# Patient Record
Sex: MALE | Race: WHITE | Employment: FULL TIME | URBAN - METROPOLITAN AREA
[De-identification: names, ages, dates, MRNs, and addresses within clinical notes are randomized per-mention and may not be internally consistent; named-entity substitution may affect disease eponyms.]

---

## 2017-07-15 DIAGNOSIS — G47.00 INSOMNIA, UNSPECIFIED TYPE: ICD-10-CM

## 2017-07-17 RX ORDER — CITALOPRAM HYDROBROMIDE 20 MG/1
TABLET ORAL
Qty: 90 TABLET | Refills: 3 | OUTPATIENT
Start: 2017-07-17

## 2017-07-17 NOTE — TELEPHONE ENCOUNTER
Due for annual CPE.  Spoke with patient and schedule for 7/21/17  Patient has enough supply for coverage until appt time.   Will defer for PCP to refill at [facility]    Next 5 appointments (look out 90 days)     Jul 21, 2017  9:40 AM CDT   PHYSICAL with Esteban Herron MD   [facility] ([facility])    [address] [place] [id number] [phone]                  Jena Walker RN

## 2017-07-17 NOTE — TELEPHONE ENCOUNTER
Citalopram (CELEXA) 20 MG tablet     Last Written Prescription Date: 07/12/2016  Last Fill Quantity: 90 tablet, # refills: 3  Last Office Visit with [facility] primary care provider:  11/25/2016        Last PHQ-9 score on record=   PHQ-9 SCORE 9/28/2010   Total Score 7               Karen Rendon MA

## 2017-07-21 ENCOUNTER — OFFICE VISIT (OUTPATIENT)
Dept: FAMILY MEDICINE | Facility: CLINIC | Age: 62
End: 2017-07-21
Payer: COMMERCIAL

## 2017-07-21 VITALS
OXYGEN SATURATION: 100 % | SYSTOLIC BLOOD PRESSURE: 105 MMHG | HEIGHT: 71 IN | HEART RATE: 54 BPM | DIASTOLIC BLOOD PRESSURE: 68 MMHG | TEMPERATURE: 97.6 F | RESPIRATION RATE: 16 BRPM | WEIGHT: 178 LBS | BODY MASS INDEX: 24.92 KG/M2

## 2017-07-21 DIAGNOSIS — Z00.00 HEALTH CARE MAINTENANCE: Primary | ICD-10-CM

## 2017-07-21 DIAGNOSIS — Z11.59 NEED FOR HEPATITIS C SCREENING TEST: ICD-10-CM

## 2017-07-21 DIAGNOSIS — Z23 NEED FOR PROPHYLACTIC VACCINATION WITH TETANUS-DIPHTHERIA (TD): ICD-10-CM

## 2017-07-21 DIAGNOSIS — Z12.5 SCREENING FOR PROSTATE CANCER: ICD-10-CM

## 2017-07-21 DIAGNOSIS — Z23 NEED FOR VACCINATION: ICD-10-CM

## 2017-07-21 LAB
ALBUMIN SERPL-MCNC: 3.6 G/DL (ref 3.4–5)
ALP SERPL-CCNC: 44 U/L (ref 40–150)
ALT SERPL W P-5'-P-CCNC: 17 U/L (ref 0–70)
ANION GAP SERPL CALCULATED.3IONS-SCNC: 4 MMOL/L (ref 3–14)
AST SERPL W P-5'-P-CCNC: 15 U/L (ref 0–45)
BILIRUB SERPL-MCNC: 0.6 MG/DL (ref 0.2–1.3)
BUN SERPL-MCNC: 14 MG/DL (ref 7–30)
CALCIUM SERPL-MCNC: 8.9 MG/DL (ref 8.5–10.1)
CHLORIDE SERPL-SCNC: 106 MMOL/L (ref 94–109)
CHOLEST SERPL-MCNC: 181 MG/DL
CO2 SERPL-SCNC: 29 MMOL/L (ref 20–32)
CREAT SERPL-MCNC: 0.85 MG/DL (ref 0.66–1.25)
GFR SERPL CREATININE-BSD FRML MDRD: ABNORMAL ML/MIN/1.7M2
GLUCOSE SERPL-MCNC: 91 MG/DL (ref 70–99)
HCV AB SERPL QL IA: NORMAL
HDLC SERPL-MCNC: 101 MG/DL
HGB BLD-MCNC: 13.8 G/DL (ref 13.3–17.7)
LDLC SERPL CALC-MCNC: 70 MG/DL
NONHDLC SERPL-MCNC: 80 MG/DL
POTASSIUM SERPL-SCNC: 4.1 MMOL/L (ref 3.4–5.3)
PROT SERPL-MCNC: 6.6 G/DL (ref 6.8–8.8)
PSA SERPL-ACNC: 0.61 UG/L (ref 0–4)
SODIUM SERPL-SCNC: 139 MMOL/L (ref 133–144)
TRIGL SERPL-MCNC: 49 MG/DL

## 2017-07-21 PROCEDURE — 80061 LIPID PANEL: CPT | Performed by: FAMILY MEDICINE

## 2017-07-21 PROCEDURE — 85018 HEMOGLOBIN: CPT | Performed by: FAMILY MEDICINE

## 2017-07-21 PROCEDURE — 36415 COLL VENOUS BLD VENIPUNCTURE: CPT | Performed by: FAMILY MEDICINE

## 2017-07-21 PROCEDURE — 99396 PREV VISIT EST AGE 40-64: CPT | Mod: 25 | Performed by: FAMILY MEDICINE

## 2017-07-21 PROCEDURE — 90714 TD VACC NO PRESV 7 YRS+ IM: CPT | Performed by: FAMILY MEDICINE

## 2017-07-21 PROCEDURE — 80053 COMPREHEN METABOLIC PANEL: CPT | Performed by: FAMILY MEDICINE

## 2017-07-21 PROCEDURE — G0103 PSA SCREENING: HCPCS | Performed by: FAMILY MEDICINE

## 2017-07-21 PROCEDURE — 90471 IMMUNIZATION ADMIN: CPT | Performed by: FAMILY MEDICINE

## 2017-07-21 PROCEDURE — 86803 HEPATITIS C AB TEST: CPT | Performed by: FAMILY MEDICINE

## 2017-07-21 NOTE — PROGRESS NOTES
SUBJECTIVE:   CC: Cheko Rodarte is an 61 year old male who presents for preventative health visit.     Physical   Annual:     Getting at least 3 servings of Calcium per day::  NO    Bi-annual eye exam::  NO    Dental care twice a year::  Yes    Sleep apnea or symptoms of sleep apnea::  Excessive snoring    Diet::  Regular (no restrictions)    Frequency of exercise::  4-5 days/week    Duration of exercise::  30-45 minutes    Taking medications regularly::  Yes    Medication side effects::  None    Additional concerns today::  No        Today's PHQ-2 Score: PHQ-2 ( 1999 Pfizer) 7/18/2017   Q1: Little interest or pleasure in doing things 0   Q2: Feeling down, depressed or hopeless 0   PHQ-2 Score 0   Q1: Little interest or pleasure in doing things Not at all   Q2: Feeling down, depressed or hopeless Not at all   PHQ-2 Score 0       Abuse: Current or Past(Physical, Sexual or Emotional)- No  Do you feel safe in your environment - Yes    Social History   Substance Use Topics     Smoking status: Never Smoker     Smokeless tobacco: Never Used     Alcohol use 7.0 oz/week     The patient does not drink >3 drinks per day nor >7 drinks per week.    Last PSA:   PSA   Date Value Ref Range Status   07/12/2016 0.50 0 - 4 ug/L Final       Reviewed orders with patient. Reviewed health maintenance and updated orders accordingly - Yes  BP Readings from Last 3 Encounters:   07/21/17 105/68   11/25/16 123/77   11/16/16 103/73    Wt Readings from Last 3 Encounters:   07/21/17 178 lb (80.7 kg)   11/25/16 180 lb (81.6 kg)   11/16/16 180 lb 6.4 oz (81.8 kg)                  Patient Active Problem List   Diagnosis     Anxiety state     Aneurysm of thoracic aorta (H)     Insomnia     Snoring     History of nephrectomy, unilateral     HYPERLIPIDEMIA LDL GOAL <160     Health Care Home     Advanced directives, counseling/discussion     History of prostatitis     Medial epicondylitis of right elbow     Past Surgical History:   Procedure Laterality  Date     CARPAL TUNNEL RELEASE RT/LT       HC COLONOSCOPY THRU STOMA, DIAGNOSTIC  2004    polyp     LASIK  6/6/2012     NEPHRECTOMY RT/LT      left     SURGICAL HISTORY OF -       basal cell removal forehead       Social History   Substance Use Topics     Smoking status: Never Smoker     Smokeless tobacco: Never Used     Alcohol use 7.0 oz/week     Family History   Problem Relation Age of Onset     C.A.D. Father      thoracic aneurysm     Cancer - colorectal Father      CANCER Father      bladder     Cancer - colorectal Maternal Uncle      Cancer - colorectal Paternal Uncle      Circulatory Brother      thoracic Aneurysm         Current Outpatient Prescriptions   Medication Sig Dispense Refill     KRILL OIL PO Take by mouth daily       fexofenadine (ALLEGRA) 180 MG tablet Take 1 tablet (180 mg) by mouth daily 30 tablet 1     Misc Natural Products (PROSTATE HEALTH PO)        Coenzyme Q10 (CO Q 10) 100 MG CAPS Take 300 mg by mouth       citalopram (CELEXA) 20 MG tablet Take 1 tablet (20 mg) by mouth daily 90 tablet 3     simvastatin (ZOCOR) 10 MG tablet TAKE 1 TABLET BY MOUTH DAILY AT BEDTIME 90 tablet 3     cholecalciferol 2000 UNITS tablet Take 2 tablets by mouth daily 90 tablet 3     Allergies   Allergen Reactions     No Known Allergies      Recent Labs   Lab Test  07/12/16   1132  04/24/15   0801  04/15/14   1327   04/27/10   0907   LDL  88  83  77   < >  112   HDL  91  86  94   < >  82   TRIG  57  59  69   < >  64   ALT  21  19  21   < >  22   CR  0.85  0.87  0.88   < >  0.91   GFRESTIMATED  >90  Non  GFR Calc    90  90   < >  88   GFRESTBLACK  >90   GFR Calc    >90   GFR Calc    >90   < >  >90   POTASSIUM  4.0  4.3  4.2   < >  4.7   TSH   --    --   0.90   --   1.41    < > = values in this interval not displayed.              Reviewed and updated as needed this visit by clinical staff         Reviewed and updated as needed this visit by Provider        Past Medical  "History:   Diagnosis Date     Attention deficit disorder without mention of hyperactivity      Insomnia, unspecified      Other and unspecified malignant neoplasm of skin of other and unspecified parts of face     BCC      Past Surgical History:   Procedure Laterality Date     CARPAL TUNNEL RELEASE RT/LT       HC COLONOSCOPY THRU STOMA, DIAGNOSTIC  2004    polyp     LASIK  6/6/2012     NEPHRECTOMY RT/LT      left     SURGICAL HISTORY OF -       basal cell removal forehead       ROS:  C: NEGATIVE for fever, chills, change in weight  I: NEGATIVE for worrisome rashes, moles or lesions  E: NEGATIVE for vision changes or irritation  ENT: NEGATIVE for ear, mouth and throat problems  R: NEGATIVE for significant cough or SOB  CV: NEGATIVE for chest pain, palpitations or peripheral edema  GI: NEGATIVE for nausea, abdominal pain, heartburn, or change in bowel habits   male: negative for dysuria, hematuria, decreased urinary stream, erectile dysfunction, urethral discharge  M: NEGATIVE for significant arthralgias or myalgia  N: NEGATIVE for weakness, dizziness or paresthesias  P: NEGATIVE for changes in mood or affect    OBJECTIVE:   /68 (Cuff Size: Adult Regular)  Pulse 54  Temp 97.6  F (36.4  C) (Tympanic)  Resp 16  Ht 5' 10.5\" (1.791 m)  Wt 178 lb (80.7 kg)  SpO2 100%  BMI 25.18 kg/m2    EXAM:  GENERAL: healthy, alert and no distress  EYES: Eyes grossly normal to inspection, PERRL and conjunctivae and sclerae normal  HENT: ear canals and TM's normal, nose and mouth without ulcers or lesions  NECK: no adenopathy, no asymmetry, masses, or scars and thyroid normal to palpation  RESP: lungs clear to auscultation - no rales, rhonchi or wheezes  CV: regular rate and rhythm, normal S1 S2, no S3 or S4, no murmur, click or rub, no peripheral edema and peripheral pulses strong  ABDOMEN: soft, nontender, no hepatosplenomegaly, no masses and bowel sounds normal   (male): normal male genitalia without lesions or " "urethral discharge, no hernia  MS: no gross musculoskeletal defects noted, no edema  SKIN: no suspicious lesions or rashes  NEURO: Normal strength and tone, mentation intact and speech normal  BACK: no CVA tenderness, no paralumbar tenderness  PSYCH: mentation appears normal, affect normal/bright  LYMPH: no cervical, supraclavicular, axillary, or inguinal adenopathy    ASSESSMENT/PLAN:       ICD-10-CM    1. Health care maintenance Z00.00 Hepatitis C Screen Reflex to HCV RNA Quant and Genotype     Hemoglobin     Lipid panel reflex to direct LDL     PSA, screen     Comprehensive metabolic panel (BMP + Alb, Alk Phos, ALT, AST, Total. Bili, TP)   2. Need for hepatitis C screening test Z11.59 Hepatitis C Screen Reflex to HCV RNA Quant and Genotype   3. Need for prophylactic vaccination with tetanus-diphtheria (TD) Z23    4. Screening for prostate cancer Z12.5 PSA, screen       COUNSELING:   Reviewed preventive health counseling, as reflected in patient instructions    BP Screening:   Last 3 BP Readings:    BP Readings from Last 3 Encounters:   07/21/17 105/68   11/25/16 123/77   11/16/16 103/73       The following was recommended to the patient:  Re-screen BP within a year and recommended lifestyle modifications       reports that he has never smoked. He has never used smokeless tobacco.      Estimated body mass index is 25.46 kg/(m^2) as calculated from the following:    Height as of 11/25/16: 5' 10.5\" (1.791 m).    Weight as of 11/25/16: 180 lb (81.6 kg).         Counseling Resources:  ATP IV Guidelines  Pooled Cohorts Equation Calculator  FRAX Risk Assessment  ICSI Preventive Guidelines  Dietary Guidelines for Americans, 2010  Daixe's MyPlate  ASA Prophylaxis  Lung CA Screening    Esteban Herron MD  [facility] for HPI/ROS submitted by the patient on 7/18/2017   PHQ-2 Score: 0    "

## 2017-07-21 NOTE — NURSING NOTE
"Chief Complaint   Patient presents with     Physical       Initial /68 (Cuff Size: Adult Regular)  Pulse 54  Temp 97.6  F (36.4  C) (Tympanic)  Resp 16  Ht 5' 10.5\" (1.791 m)  Wt 178 lb (80.7 kg)  SpO2 100%  BMI 25.18 kg/m2 Estimated body mass index is 25.18 kg/(m^2) as calculated from the following:    Height as of this encounter: 5' 10.5\" (1.791 m).    Weight as of this encounter: 178 lb (80.7 kg).  Medication Reconciliation: complete   Lillian England, CMA    "

## 2017-07-21 NOTE — MR AVS SNAPSHOT
After Visit Summary   7/21/2017    Cheko Rodarte    MRN: [id number]           Patient Information     Date Of Birth          1955        Visit Information        Provider Department      7/21/2017 9:40 AM Esteban Herron MD [facility]        Today's Diagnoses     Health care maintenance    -  1    Need for hepatitis C screening test        Need for prophylactic vaccination with tetanus-diphtheria (TD)        Screening for prostate cancer        Need for vaccination          Care Instructions      Preventive Health Recommendations  Male Ages 50 - 64    Yearly exam:             See your health care provider every year in order to  o   Review health changes.   o   Discuss preventive care.    o   Review your medicines if your doctor has prescribed any.     Have a cholesterol test every 5 years, or more frequently if you are at risk for high cholesterol/heart disease.     Have a diabetes test (fasting glucose) every three years. If you are at risk for diabetes, you should have this test more often.     Have a colonoscopy at age 50, or have a yearly FIT test (stool test). These exams will check for colon cancer.      Talk with your health care provider about whether or not a prostate cancer screening test (PSA) is right for you.    You should be tested each year for STDs (sexually transmitted diseases), if you re at risk.     Shots: Get a flu shot each year. Get a tetanus shot every 10 years.     Nutrition:    Eat at least 5 servings of fruits and vegetables daily.     Eat whole-grain bread, whole-wheat pasta and brown rice instead of white grains and rice.     Talk to your provider about Calcium and Vitamin D.     Lifestyle    Exercise for at least 150 minutes a week (30 minutes a day, 5 days a week). This will help you control your weight and prevent disease.     Limit alcohol to one drink per day.     No smoking.     Wear sunscreen to prevent skin cancer.     See your dentist every  "six months for an exam and cleaning.     See your eye doctor every 1 to 2 years.            Follow-ups after your visit        Who to contact     If you have questions or need follow up information about today's clinic visit or your schedule please contact [facility] [place] directly at [phone].  Normal or non-critical lab and imaging results will be communicated to you by MyChart, letter or phone within 4 business days after the clinic has received the results. If you do not hear from us within 7 days, please contact the clinic through [employer] or phone. If you have a critical or abnormal lab result, we will notify you by phone as soon as possible.  Submit refill requests through [employer] or call your pharmacy and they will forward the refill request to us. Please allow 3 business days for your refill to be completed.          Additional Information About Your Visit        MyChart Information     [employer] gives you secure access to your electronic health record. If you see a primary care provider, you can also send messages to your care team and make appointments. If you have questions, please call your primary care clinic.  If you do not have a primary care provider, please call [phone] and they will assist you.        Care EveryWhere ID     This is your Care EveryWhere ID. This could be used by other organizations to access your [place] medical records  [id number]        Your Vitals Were     Pulse Temperature Respirations Height Pulse Oximetry BMI (Body Mass Index)    54 97.6  F (36.4  C) (Tympanic) 16 5' 10.5\" (1.791 m) 100% 25.18 kg/m2       Blood Pressure from Last 3 Encounters:   07/21/17 105/68   11/25/16 123/77   11/16/16 103/73    Weight from Last 3 Encounters:   07/21/17 178 lb (80.7 kg)   11/25/16 180 lb (81.6 kg)   11/16/16 180 lb 6.4 oz (81.8 kg)              We Performed the Following     1st  Administration  [[id number]]     Comprehensive metabolic panel (BMP + Alb, Alk Phos, ALT, " AST, Total. Bili, TP)     Hemoglobin     Hepatitis C Screen Reflex to HCV RNA Quant and Genotype     Lipid panel reflex to direct LDL     PSA, screen     TD PRSERV FREE >=7 YRS ADS IM [[id number]]        Primary Care Provider Office Phone # Fax #    Esteban Herron [phone]       [place] [address] [facility] [address]        Equal Access to Services     MARTHA CLARKE : Hadii aad ku hadasho Soomaali, waaxda luqadaha, qaybta kaalmada adeegyada, waxay idiin hayaan adeeg kharash la'aan . So [facility] [phone].    ATENCIÓN: Si habla español, tiene a orlando disposición servicios gratuitos de asistencia lingüística. Llame al [phone].    We comply with applicable federal civil rights laws and [place] laws. We do not discriminate on the basis of race, color, national origin, age, disability sex, sexual orientation or gender identity.            Thank you!     Thank you for choosing [facility]  for your care. Our goal is always to provide you with excellent care. Hearing back from our patients is one way we can continue to improve our services. Please take a few minutes to complete the written survey that you may receive in the mail after your visit with us. Thank you!             Your Updated Medication List - Protect others around you: Learn how to safely use, store and throw away your medicines at www.disposemymeds.org.          This list is accurate as of: 7/21/17 10:23 AM.  Always use your most recent med list.                   Brand Name Dispense Instructions for use Diagnosis    cholecalciferol 2000 UNITS tablet     90 tablet    Take 2 tablets by mouth daily    Hyperlipidemia LDL goal <160       citalopram 20 MG tablet    celeXA    90 tablet    Take 1 tablet (20 mg) by mouth daily    Insomnia, unspecified type       Co Q 10 100 MG Caps      Take 300 mg by mouth        fexofenadine 180 MG tablet    ALLEGRA    30 tablet    Take 1 tablet (180 mg) by mouth  daily    Cough, Seasonal allergic rhinitis due to other allergic trigger       KRILL OIL PO      Take by mouth daily        PROSTATE HEALTH PO           simvastatin 10 MG tablet    ZOCOR    90 tablet    TAKE 1 TABLET BY MOUTH DAILY AT BEDTIME    Hyperlipidemia LDL goal <160

## 2017-07-26 DIAGNOSIS — E78.5 HYPERLIPIDEMIA LDL GOAL <160: ICD-10-CM

## 2017-07-26 RX ORDER — SIMVASTATIN 10 MG
TABLET ORAL
Qty: 90 TABLET | Refills: 3 | Status: SHIPPED | OUTPATIENT
Start: 2017-07-26 | End: 2018-06-04

## 2017-07-26 NOTE — TELEPHONE ENCOUNTER
Simvastatin     Last Written Prescription Date: 7/12/16  Last Fill Quantity: 90, # refills: 3  Last Office Visit with [facility], [facility] or [facility] prescribing provider: 7/21/17       Lab Results   Component Value Date    CHOL 181 07/21/2017     Lab Results   Component Value Date     07/21/2017     Lab Results   Component Value Date    LDL 70 07/21/2017     Lab Results   Component Value Date    TRIG 49 07/21/2017     Lab Results   Component Value Date    CHOLHDLRATIO 2.1 04/24/2015     Jeannette Magdaleno CMA

## 2017-07-26 NOTE — TELEPHONE ENCOUNTER
Prescription approved per [facility], [facility] or [facility] refill protocol.  Destinee Elena RN - Triage  [facility]

## 2017-07-29 DIAGNOSIS — E78.5 HYPERLIPIDEMIA LDL GOAL <160: ICD-10-CM

## 2017-07-31 RX ORDER — SIMVASTATIN 10 MG
TABLET ORAL
Qty: 90 TABLET | Refills: 3 | OUTPATIENT
Start: 2017-07-31

## 2017-07-31 NOTE — TELEPHONE ENCOUNTER
Simvastatin     Last Written Prescription Date: 7/26/17  Last Fill Quantity: 90, # refills: 3  Last Office Visit with [facility], [facility] or [facility] prescribing provider: 7/21/17       Lab Results   Component Value Date    CHOL 181 07/21/2017     Lab Results   Component Value Date     07/21/2017     Lab Results   Component Value Date    LDL 70 07/21/2017     Lab Results   Component Value Date    TRIG 49 07/21/2017     Lab Results   Component Value Date    CHOLHDLRATIO 2.1 04/24/2015     Jeannette Magdaleno CMA

## 2017-09-12 ENCOUNTER — OFFICE VISIT (OUTPATIENT)
Dept: FAMILY MEDICINE | Facility: CLINIC | Age: 62
End: 2017-09-12
Payer: COMMERCIAL

## 2017-09-12 VITALS
RESPIRATION RATE: 16 BRPM | HEIGHT: 71 IN | DIASTOLIC BLOOD PRESSURE: 62 MMHG | TEMPERATURE: 98.5 F | SYSTOLIC BLOOD PRESSURE: 96 MMHG | OXYGEN SATURATION: 98 % | BODY MASS INDEX: 24.5 KG/M2 | WEIGHT: 175 LBS | HEART RATE: 81 BPM

## 2017-09-12 DIAGNOSIS — G47.33 OSA (OBSTRUCTIVE SLEEP APNEA): ICD-10-CM

## 2017-09-12 DIAGNOSIS — H90.2 CONDUCTIVE HEARING LOSS, UNSPECIFIED LATERALITY: ICD-10-CM

## 2017-09-12 DIAGNOSIS — M25.511 ARTHRALGIA OF RIGHT ACROMIOCLAVICULAR JOINT: ICD-10-CM

## 2017-09-12 DIAGNOSIS — J20.9 ACUTE BRONCHITIS WITH SYMPTOMS > 10 DAYS: Primary | ICD-10-CM

## 2017-09-12 PROCEDURE — 99214 OFFICE O/P EST MOD 30 MIN: CPT | Performed by: FAMILY MEDICINE

## 2017-09-12 RX ORDER — AZITHROMYCIN 250 MG/1
TABLET, FILM COATED ORAL
Qty: 6 TABLET | Refills: 0 | Status: SHIPPED | OUTPATIENT
Start: 2017-09-12 | End: 2018-01-26

## 2017-09-12 NOTE — MR AVS SNAPSHOT
After Visit Summary   9/12/2017    Cheko Rodarte    MRN: [id number]           Patient Information     Date Of Birth          1955        Visit Information        Provider Department      9/12/2017 1:40 PM Esteban Herron [place] [facility]        Today's Diagnoses     Acute bronchitis with symptoms > 10 days    -  1    Conductive hearing loss, unspecified laterality        TALI (obstructive sleep apnea)        Arthralgia of right acromioclavicular joint           Follow-ups after your visit        Additional Services     OTOLARYNGOLOGY REFERRAL       Your provider has referred you to: [facility] [place] [phone]   [email or web address]    Please be aware that coverage of these services is subject to the terms and limitations of your health insurance plan.  Call member services at your health plan with any benefit or coverage questions.      Please bring the following with you to your appointment:    (1) Any X-Rays, CTs or MRIs which have been performed.  Contact the facility where they were done to arrange for  prior to your scheduled appointment.   (2) List of current medications  (3) This referral request   (4) Any documents/labs given to you for this referral            SLEEP EVALUATION & MANAGEMENT REFERRAL - ADULT       Please be aware that coverage of these services is subject to the terms and limitations of your health insurance plan.  Call member services at your health plan with any benefit or coverage questions.      Please bring the following to your appointment:    >>   List of current medications   >>   This referral request   >>   Any documents/labs given to you for this referral    [facility] ([place])   [phone] (Age 18 and up)                  Future tests that were ordered for you today     Open Future Orders        Priority Expected Expires Ordered    SLEEP EVALUATION & MANAGEMENT REFERRAL - ADULT Routine   "9/12/2018 9/12/2017            Who to contact     If you have questions or need follow up information about today's clinic visit or your schedule please contact [facility] [place] directly at [phone].  Normal or non-critical lab and imaging results will be communicated to you by [employer], letter or phone within 4 business days after the clinic has received the results. If you do not hear from us within 7 days, please contact the clinic through [employer] or phone. If you have a critical or abnormal lab result, we will notify you by phone as soon as possible.  Submit refill requests through [employer] or call your pharmacy and they will forward the refill request to us. Please allow 3 business days for your refill to be completed.          Additional Information About Your Visit        [employer] Information     [employer] gives you secure access to your electronic health record. If you see a primary care provider, you can also send messages to your care team and make appointments. If you have questions, please call your primary care clinic.  If you do not have a primary care provider, please call [phone] and they will assist you.        Care EveryWhere ID     This is your Care EveryWhere ID. This could be used by other organizations to access your [place] medical records  [id number]        Your Vitals Were     Pulse Temperature Respirations Height Pulse Oximetry BMI (Body Mass Index)    81 98.5  F (36.9  C) (Tympanic) 16 5' 10.5\" (1.791 m) 98% 24.75 kg/m2       Blood Pressure from Last 3 Encounters:   09/12/17 96/62   07/21/17 105/68   11/25/16 123/77    Weight from Last 3 Encounters:   09/12/17 175 lb (79.4 kg)   07/21/17 178 lb (80.7 kg)   11/25/16 180 lb (81.6 kg)              We Performed the Following     OTOLARYNGOLOGY REFERRAL          Today's Medication Changes          These changes are accurate as of: 9/12/17  2:09 PM.  If you have any questions, ask your nurse or doctor.               Start " taking these medicines.        Dose/Directions    azithromycin 250 MG tablet   Commonly known as:  ZITHROMAX   Used for:  Acute bronchitis with symptoms > 10 days   Started by:  Esteban Herron MD        Two tablets first day, then one tablet daily for four days.   Quantity:  6 tablet   Refills:  0            Where to get your medicines      These medications were sent to [facility] [id number] [facility] [place] [address]  [address] [place] [address], [facility] [address]     Phone:  [phone]     azithromycin 250 MG tablet                Primary Care Provider Office Phone # Fax #    Esteban Herron [phone]       [address] [facility] [address]        Equal Access to Services     [facility] : Hadii yahaira richardson hadasho Somack, waaxda luqadaha, qaybta kaalmada adeegyada, naseem gold . So [facility] [phone].    ATENCIÓN: Si habla español, tiene a orlando disposición servicios gratuitos de asistencia lingüística. Llame al [phone].    We comply with applicable federal civil rights laws and [place] laws. We do not discriminate on the basis of race, color, national origin, age, disability sex, sexual orientation or gender identity.            Thank you!     Thank you for choosing [facility]  for your care. Our goal is always to provide you with excellent care. Hearing back from our patients is one way we can continue to improve our services. Please take a few minutes to complete the written survey that you may receive in the mail after your visit with us. Thank you!             Your Updated Medication List - Protect others around you: Learn how to safely use, store and throw away your medicines at www.disposemymeds.org.          This list is accurate as of: 9/12/17  2:09 PM.  Always use your most recent med list.                   Brand Name Dispense Instructions for use Diagnosis    azithromycin 250 MG tablet    ZITHROMAX    6 tablet    Two  tablets first day, then one tablet daily for four days.    Acute bronchitis with symptoms > 10 days       cholecalciferol 2000 UNITS tablet     90 tablet    Take 2 tablets by mouth daily    Hyperlipidemia LDL goal <160       citalopram 20 MG tablet    celeXA    90 tablet    Take 1 tablet (20 mg) by mouth daily    Insomnia, unspecified type       Co Q 10 100 MG Caps      Take 300 mg by mouth        KRILL OIL PO      Take by mouth daily        PROSTATE HEALTH PO           simvastatin 10 MG tablet    ZOCOR    90 tablet    TAKE 1 TABLET BY MOUTH DAILY AT BEDTIME    Hyperlipidemia LDL goal <160

## 2017-09-12 NOTE — PROGRESS NOTES
SUBJECTIVE:   Cheko Rodarte is a 61 year old male who presents to clinic today for the following health issues:      RESPIRATORY SYMPTOMS      Duration: 6 days     Description  nasal congestion, rhinorrhea and cough    Severity: moderate    Accompanying signs and symptoms: None    History (predisposing factors):  none    Precipitating or alleviating factors: None    Therapies tried and outcome:  NyQuil     Problem list and histories reviewed & adjusted, as indicated.  Additional history: as documented    Patient Active Problem List   Diagnosis     Anxiety state     Aneurysm of thoracic aorta (H)     Insomnia     Snoring     History of nephrectomy, unilateral     HYPERLIPIDEMIA LDL GOAL <160     Health Care Home     Advanced directives, counseling/discussion     History of prostatitis     Medial epicondylitis of right elbow     Past Surgical History:   Procedure Laterality Date     CARPAL TUNNEL RELEASE RT/LT       HC COLONOSCOPY THRU STOMA, DIAGNOSTIC  2004    polyp     LASIK  6/6/2012     NEPHRECTOMY RT/LT      left     SURGICAL HISTORY OF -       basal cell removal forehead       Social History   Substance Use Topics     Smoking status: Never Smoker     Smokeless tobacco: Never Used     Alcohol use 7.0 oz/week     Family History   Problem Relation Age of Onset     C.A.D. Father      thoracic aneurysm     Cancer - colorectal Father      CANCER Father      bladder     Cancer - colorectal Maternal Uncle      Cancer - colorectal Paternal Uncle      Circulatory Brother      thoracic Aneurysm         Current Outpatient Prescriptions   Medication Sig Dispense Refill     azithromycin (ZITHROMAX) 250 MG tablet Two tablets first day, then one tablet daily for four days. 6 tablet 0     simvastatin (ZOCOR) 10 MG tablet TAKE 1 TABLET BY MOUTH DAILY AT BEDTIME 90 tablet 3     KRILL OIL PO Take by mouth daily       Misc Natural Products (PROSTATE HEALTH PO)        Coenzyme Q10 (CO Q 10) 100 MG CAPS Take 300 mg by mouth        "citalopram (CELEXA) 20 MG tablet Take 1 tablet (20 mg) by mouth daily 90 tablet 3     cholecalciferol 2000 UNITS tablet Take 2 tablets by mouth daily 90 tablet 3     Allergies   Allergen Reactions     No Known Allergies      Recent Labs   Lab Test  07/21/17   1016  07/12/16   1132  04/24/15   0801  04/15/14   1327   04/27/10   0907   LDL  70  88  83  77   < >  112   HDL  101  91  86  94   < >  82   TRIG  49  57  59  69   < >  64   ALT  17  21  19  21   < >  22   CR  0.85  0.85  0.87  0.88   < >  0.91   GFRESTIMATED  >90  Non  GFR Calc    >90  Non  GFR Calc    90  90   < >  88   GFRESTBLACK  >90   GFR Calc    >90   GFR Calc    >90   GFR Calc    >90   < >  >90   POTASSIUM  4.1  4.0  4.3  4.2   < >  4.7   TSH   --    --    --   0.90   --   1.41    < > = values in this interval not displayed.      BP Readings from Last 3 Encounters:   09/12/17 96/62   07/21/17 105/68   11/25/16 123/77    Wt Readings from Last 3 Encounters:   09/12/17 175 lb (79.4 kg)   07/21/17 178 lb (80.7 kg)   11/25/16 180 lb (81.6 kg)          Reviewed and updated as needed this visit by clinical staff     Reviewed and updated as needed this visit by Provider         ROS:  C: NEGATIVE for fever, chills, change in weight  ENT/MOUTH: cough and sinus drainage, difficulty hearing   R: NEGATIVE for significant cough or SOB  CV: NEGATIVE for chest pain, palpitations or peripheral edema  MUSCULOSKELETAL: tip of shoulder pain    OBJECTIVE:     BP 96/62 (Cuff Size: Adult Large)  Pulse 81  Temp 98.5  F (36.9  C) (Tympanic)  Resp 16  Ht 5' 10.5\" (1.791 m)  Wt 175 lb (79.4 kg)  SpO2 98%  BMI 24.75 kg/m2  Body mass index is 24.75 kg/(m^2).  GENERAL: healthy, alert and no distress  EYES: Eyes grossly normal to inspection, PERRL and conjunctivae and sclerae normal  HENT: normal cephalic/atraumatic, ear canals and TM's normal, nasal mucosa edematous , rhinorrhea yellow and " oropharxnx crowded  NECK: no adenopathy, no asymmetry, masses, or scars and thyroid normal to palpation  RESP: expiratory wheezes throughout  CV: regular rate and rhythm, normal S1 S2, no S3 or S4, no murmur, click or rub, no peripheral edema and peripheral pulses strong  ABDOMEN: soft, nontender, no hepatosplenomegaly, no masses and bowel sounds normal  MS: no tenderness on AC joint today         ASSESSMENT/PLAN:     (J20.9) Acute bronchitis with symptoms > 10 days  (primary encounter diagnosis)  Comment: has sinus tenderness and purulent postnasal drainage, will have him to try abx   Plan: azithromycin (ZITHROMAX) 250 MG tablet            (H90.2) Conductive hearing loss, unspecified laterality  Comment: worsening hearing, no wax on bilateral ear, will have him to see ENT for further evaluation   Plan: OTOLARYNGOLOGY REFERRAL            (G47.33) TALI (obstructive sleep apnea)  Comment: worsening with loud snoring, has worsening day time sleepiness, will have him to see sleep clinic for further evaluation   Plan: SLEEP EVALUATION & MANAGEMENT REFERRAL - ADULT            (M25.511) Arthralgia of right acromioclavicular joint  Comment: had AC joint tenderness 1-2 weeks ago for  minutes and disappear, has no other sign of cardiopulmonic pathology, has been hemodynamically stable, will have him keep monitoring   Plan: mentioned above       Esteban Herron MD  [facility] [place]

## 2017-09-12 NOTE — NURSING NOTE
"Chief Complaint   Patient presents with     URI       Initial BP 96/62 (Cuff Size: Adult Large)  Pulse 81  Temp 98.5  F (36.9  C) (Tympanic)  Resp 16  Ht 5' 10.5\" (1.791 m)  Wt 175 lb (79.4 kg)  SpO2 98%  BMI 24.75 kg/m2 Estimated body mass index is 24.75 kg/(m^2) as calculated from the following:    Height as of this encounter: 5' 10.5\" (1.791 m).    Weight as of this encounter: 175 lb (79.4 kg).  Medication Reconciliation: complete   Lillian England, CMA'  "

## 2017-10-02 ENCOUNTER — OFFICE VISIT (OUTPATIENT)
Dept: SLEEP MEDICINE | Facility: CLINIC | Age: 62
End: 2017-10-02
Attending: FAMILY MEDICINE
Payer: COMMERCIAL

## 2017-10-02 VITALS
OXYGEN SATURATION: 98 % | DIASTOLIC BLOOD PRESSURE: 61 MMHG | RESPIRATION RATE: 16 BRPM | WEIGHT: 178.2 LBS | SYSTOLIC BLOOD PRESSURE: 101 MMHG | HEIGHT: 71 IN | BODY MASS INDEX: 24.95 KG/M2 | HEART RATE: 74 BPM

## 2017-10-02 DIAGNOSIS — R06.83 SNORING: Primary | ICD-10-CM

## 2017-10-02 DIAGNOSIS — G47.33 OSA (OBSTRUCTIVE SLEEP APNEA): ICD-10-CM

## 2017-10-02 DIAGNOSIS — G47.9 SLEEP DISTURBANCE: ICD-10-CM

## 2017-10-02 DIAGNOSIS — G47.00 INSOMNIA, UNSPECIFIED TYPE: ICD-10-CM

## 2017-10-02 PROCEDURE — 99244 OFF/OP CNSLTJ NEW/EST MOD 40: CPT | Performed by: INTERNAL MEDICINE

## 2017-10-02 NOTE — MR AVS SNAPSHOT
After Visit Summary   10/2/2017    Cheko Rodarte    MRN: [id number]           Patient Information     Date Of Birth          1955        Visit Information        Provider Department      10/2/2017 2:00 PM Lazaro Cochran MD [facility]        Today's Diagnoses     Snoring    -  1    TALI (obstructive sleep apnea)        Sleep disturbance        Insomnia, unspecified type          Care Instructions      Your BMI is Body mass index is 25.21 kg/(m^2).  Weight management is a personal decision.  If you are interested in exploring weight loss strategies, the following discussion covers the approaches that may be successful. Body mass index (BMI) is one way to tell whether you are at a healthy weight, overweight, or obese. It measures your weight in relation to your height.  A BMI of 18.5 to 24.9 is in the healthy range. A person with a BMI of 25 to 29.9 is considered overweight, and someone with a BMI of 30 or greater is considered obese. More than two-thirds of American adults are considered overweight or obese.  Being overweight or obese increases the risk for further weight gain. Excess weight may lead to heart disease and diabetes.  Creating and following plans for healthy eating and physical activity may help you improve your health.  Weight control is part of healthy lifestyle and includes exercise, emotional health, and healthy eating habits. Careful eating habits lifelong are the mainstay of weight control. Though there are significant health benefits from weight loss, long-term weight loss with diet alone may be very difficult to achieve- studies show long-term success with dietary management in less than 10% of people. Attaining a healthy weight may be especially difficult to achieve in those with severe obesity. In some cases, medications, devices and surgical management might be considered.  What can you do?  If you are overweight or obese and are interested in methods for  weight loss, you should discuss this with your provider.     Consider reducing daily calorie intake by 500 calories.     Keep a food journal.     Avoiding skipping meals, consider cutting portions instead.    Diet combined with exercise helps maintain muscle while optimizing fat loss. Strength training is particularly important for building and maintaining muscle mass. Exercise helps reduce stress, increase energy, and improves fitness. Increasing exercise without diet control, however, may not burn enough calories to loose weight.       Start walking three days a week 10-20 minutes at a time    Work towards walking thirty minutes five days a week     Eventually, increase the speed of your walking for 1-2 minutes at time    In addition, we recommend that you review healthy lifestyles and methods for weight loss available through the National Institutes of Health patient information sites:  http://win.niddk.nih.gov/publications/index.htm    And look into health and wellness programs that may be available through your health insurance provider, employer, local community center, or jeromy club.    Weight management plan: Patient was referred to their PCP to discuss a diet and exercise plan.            Follow-ups after your visit        Your next 10 appointments already scheduled     Nov 13, 2017  8:30 PM CST   PSG Split with BED 4 SH SLEEP   [facility] ([facility])    [address] [address]   [phone]            Nov 27, 2017  2:30 PM CST   Return Sleep Patient with Lazaro Cochran MD   [facility] ([facility])    [address] [address]   [phone]              Future tests that were ordered for you today     Open Future Orders        Priority Expected Expires Ordered    Comprehensive Sleep Study Routine  3/31/2018 10/2/2017            Who to contact     If you have questions  "or need follow up information about today's clinic visit or your schedule please contact [facility] directly at [phone].  Normal or non-critical lab and imaging results will be communicated to you by MyChart, letter or phone within 4 business days after the clinic has received the results. If you do not hear from us within 7 days, please contact the clinic through [employer] or phone. If you have a critical or abnormal lab result, we will notify you by phone as soon as possible.  Submit refill requests through [employer] or call your pharmacy and they will forward the refill request to us. Please allow 3 business days for your refill to be completed.          Additional Information About Your Visit        [facility] Information     [employer] gives you secure access to your electronic health record. If you see a primary care provider, you can also send messages to your care team and make appointments. If you have questions, please call your primary care clinic.  If you do not have a primary care provider, please call [phone] and they will assist you.        Care EveryWhere ID     This is your Care EveryWhere ID. This could be used by other organizations to access your [facility] medical records  [id number]        Your Vitals Were     Pulse Respirations Height Pulse Oximetry BMI (Body Mass Index)       74 16 1.791 m (5' 10.5\") 98% 25.21 kg/m2        Blood Pressure from Last 3 Encounters:   10/02/17 101/61   09/12/17 96/62   07/21/17 105/68    Weight from Last 3 Encounters:   10/02/17 80.8 kg (178 lb 3.2 oz)   09/12/17 79.4 kg (175 lb)   07/21/17 80.7 kg (178 lb)              We Performed the Following     SLEEP EVALUATION & MANAGEMENT REFERRAL - ADULT        Primary Care Provider Office Phone # Fax #    Esteban Herron [phone]       [address] [place] [address]        Equal Access to Services     MARTHA CLARKE AH: Sharyn David, jalilda luqadakatelyn, qaybta " naseem isbelliva gold ah. [facility] [phone].    ATENCIÓN: Si terrell stevens, tiene a orlando disposición servicios gratuitos de asistencia lingüística. James al [phone].    We comply with applicable federal civil rights laws and [place] laws. We do not discriminate on the basis of race, color, national origin, age, disability, sex, sexual orientation, or gender identity.            Thank you!     Thank you for choosing [facility] [place]  for your care. Our goal is always to provide you with excellent care. Hearing back from our patients is one way we can continue to improve our services. Please take a few minutes to complete the written survey that you may receive in the mail after your visit with us. Thank you!             Your Updated Medication List - Protect others around you: Learn how to safely use, store and throw away your medicines at www.disposemymeds.org.          This list is accurate as of: 10/2/17  2:33 PM.  Always use your most recent med list.                   Brand Name Dispense Instructions for use Diagnosis    azithromycin 250 MG tablet    ZITHROMAX    6 tablet    Two tablets first day, then one tablet daily for four days.    Acute bronchitis with symptoms > 10 days       cholecalciferol 2000 UNITS tablet     90 tablet    Take 2 tablets by mouth daily    Hyperlipidemia LDL goal <160       citalopram 20 MG tablet    celeXA    90 tablet    Take 1 tablet (20 mg) by mouth daily    Insomnia, unspecified type       Co Q 10 100 MG Caps      Take 300 mg by mouth        KRILL OIL PO      Take by mouth daily        PROSTATE HEALTH PO           simvastatin 10 MG tablet    ZOCOR    90 tablet    TAKE 1 TABLET BY MOUTH DAILY AT BEDTIME    Hyperlipidemia LDL goal <160

## 2017-10-02 NOTE — PATIENT INSTRUCTIONS

## 2017-10-02 NOTE — PROGRESS NOTES
Sleep Consultation:    Date on this visit: 10/2/2017    Cheko Rodarte is sent by Esteban Herron for a sleep consultation regarding sleep apnea.    Primary Physician: Esteban Herron     Chief complaint: snoring    Presenting History:     Cheko Rodarte reports nightly snoring for last 10 years.     Cheko does snore every night. Patient does have a regular bed partner. There is report of snoring, choking and snorting.  He does not have witnessed apneas. They frequently sleep separately.  Patient sleeps on his side. He has occasional morning dry mouth.     He denies restless leg symptoms.     Cheko denies any bruxism, sleep walking, sleep talking, dream enactment, sleep paralysis, cataplexy and hypnogogic/hypnopompic hallucinations.    Cheko goes to sleep at 9:30 PM during the week. He wakes up at 6:30 AM without an alarm. He falls asleep in 60 minutes.  Cheko has difficulty falling asleep.  He wakes up 1-2 times a night for 5 minutes before falling back to sleep.  Cheko wakes up to go to the bathroom.  On weekends, Cheko goes to sleep at 10:00 PM.  He wakes up at 8:00 AM without an alarm. He falls asleep in 60 minutes.  Patient gets an average of 7 hours of sleep per night.     Once in a week, patient has significant difficulty falling asleep due to an active mind . He will lie in bed until 1-2 am changing position and feeling frustrated.     Patient does not use electronics in bed and watch TV in bed.     Cheko does not do shift work.  He works day shifts.      He denies sleep walking and sleep talking as a child.      Cheko has reflux at night.      Patient's [place] Sleepiness score 1/24 consistent with no daytime sleepiness.      Cheko naps 0 times per week.  He takes no inadvertant naps.  He denies closing eyes, dozing and falling asleep while driving. T     He uses 2 cups/day of coffee. Last caffeine intake is usually before 9 am.    Allergies:    Allergies   Allergen Reactions     No Known Allergies         Medications:    Current Outpatient Prescriptions   Medication Sig Dispense Refill     azithromycin (ZITHROMAX) 250 MG tablet Two tablets first day, then one tablet daily for four days. 6 tablet 0     simvastatin (ZOCOR) 10 MG tablet TAKE 1 TABLET BY MOUTH DAILY AT BEDTIME 90 tablet 3     KRILL OIL PO Take by mouth daily       Misc Natural Products (PROSTATE HEALTH PO)        Coenzyme Q10 (CO Q 10) 100 MG CAPS Take 300 mg by mouth       citalopram (CELEXA) 20 MG tablet Take 1 tablet (20 mg) by mouth daily 90 tablet 3     cholecalciferol 2000 UNITS tablet Take 2 tablets by mouth daily 90 tablet 3       Problem List:  Patient Active Problem List    Diagnosis Date Noted     Health Care Home 02/03/2012     Priority: High     x  DX V65.8 REPLACED WITH 69126 HEALTH CARE HOME (04/08/2013)       Medial epicondylitis of right elbow 08/26/2014     Priority: Medium     History of prostatitis 04/15/2014     Priority: Medium     Advanced directives, counseling/discussion 02/20/2013     Priority: Medium     Discussed advance care planning with patient; information given to patient to review. 2/20/2013          HYPERLIPIDEMIA LDL GOAL <160 10/31/2010     Priority: Medium     Snoring 04/27/2010     Priority: Medium     History of nephrectomy, unilateral 04/27/2010     Priority: Medium     left       Insomnia 01/31/2006     Priority: Medium     Problem list name updated by automated process. Provider to review       Aneurysm of thoracic aorta (H) 08/30/2005     Priority: Medium     Problem list name updated by automated process. Provider to review       Anxiety state 08/16/2004     Priority: Medium     Problem list name updated by automated process. 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     Social History Main Topics     Smoking status: Never Smoker     Smokeless tobacco: Never Used     Alcohol use 7.0 oz/week     Drug use: No     Sexual activity: Yes     Partners: Female     Other Topics Concern      Service No     Blood Transfusions No     Caffeine Concern No     Occupational Exposure No     Hobby Hazards No     Sleep Concern Yes     Stress Concern No     on Zoloft     Weight Concern No     Special Diet No     Back Care No     Exercise Yes     $ days a week     Bike Helmet Yes     Seat Belt Yes     Self-Exams No     Social History Narrative       Family History:  Family History   Problem Relation Age of Onset     C.A.D. Father      thoracic aneurysm     Cancer - colorectal Father      CANCER Father      bladder     Cancer - colorectal Maternal Uncle      Cancer - colorectal Paternal Uncle      Circulatory Brother      thoracic Aneurysm       Review of Systems:  A complete review of systems reviewed by me is negative with the exeption of what has been mentioned in the history of present illness.  CONSTITUTIONAL: NEGATIVE for weight gain/loss, fever, chills, sweats or night sweats, drug allergies.  EYES: NEGATIVE for changes in vision, blind spots, double vision.  ENT: NEGATIVE for ear pain, sore throat, sinus pain, post-nasal drip, runny nose, bloody nose  CARDIAC: NEGATIVE for fast heartbeats or fluttering in chest, chest pain or pressure, breathlessness when lying flat, swollen legs or swollen feet.  NEUROLOGIC: NEGATIVE  "headaches, weakness or numbness in the arms or legs.  DERMATOLOGIC: NEGATIVE for rashes, new moles or change in mole(s)  PULMONARY:  POSITIVE for  productive cough  GASTROINTESTINAL: NEGATIVE for nausea or vomitting, loose or watery stools, fat or grease in stools, constipation, abdominal pain, bowel movements black in color or blood noted.  GENITOURINARY: NEGATIVE for pain during urination, blood in urine, urinating more frequently than usual, irregular menstrual periods.  MUSCULOSKELETAL: NEGATIVE for muscle pain, bone or joint pain, swollen joints.  ENDOCRINE: NEGATIVE for increased thirst or urination, diabetes.  LYMPHATIC: NEGATIVE for swollen lymph nodes, lumps or bumps in the breasts or nipple discharge.    Physical Examination:  Vitals: /61  Pulse 74  Resp 16  Ht 1.791 m (5' 10.5\")  Wt 80.8 kg (178 lb 3.2 oz)  SpO2 98%  BMI 25.21 kg/m2  BMI= Body mass index is 25.21 kg/(m^2).    Neck Cir (cm): 38 cm    [place] Total Score 10/2/2017   Total score - [place] 2       GENERAL APPEARANCE: healthy, alert and no distress  EYES: Eyes grossly normal to inspection, PERRL and conjunctivae and sclerae normal  HENT: nose and mouth without ulcers or lesions and oropharynx crowded  NECK: no adenopathy, no asymmetry, masses, or scars and thyroid normal to palpation  RESP: lungs clear to auscultation - no rales, rhonchi or wheezes  CV: regular rates and rhythm, normal S1 S2, no S3 or S4 and no murmur, click or rub  ABDOMEN: soft, nontender, without hepatosplenomegaly or masses  MS: extremities normal- no gross deformities noted  NEURO: Normal strength and tone, mentation intact and speech normal  PSYCH: mentation appears normal and affect normal/bright  Mallampati Class: IV.  Tonsillar Stage: 1  hidden by pillars.    Impression/Plan:    1. To rule out obstructive sleep apnea   2. Snoring   3. Insomnia     - Patient, 63 yo male, with BMI of 25, neck circumference 38 cm, presents with history of snoring and sleep " disturbance. He has a narrow oropharynx on examination. Obstructive sleep apnea needs to be ruled out and an overnight sleep study is recommended.     Plan:     1. Split night PSG for assessment of sleep apnea       He will follow up with me in approximately two weeks after his sleep study has been competed to review the results and discuss plan of care.       Polysomnography reviewed.  Obstructive sleep apnea reviewed.  Complications of untreated sleep apnea were reviewed.    I spent a total of 30 minutes with patient with more than 50% in counseling     Lazaro Cochran MD     CC: Esteban Herron

## 2017-10-02 NOTE — NURSING NOTE
"Chief Complaint   Patient presents with     Sleep Problem     consult, \"Snoring.\"       Initial /61  Pulse 74  Resp 16  Ht 1.791 m (5' 10.5\")  Wt 80.8 kg (178 lb 3.2 oz)  SpO2 98%  BMI 25.21 kg/m2 Estimated body mass index is 25.21 kg/(m^2) as calculated from the following:    Height as of this encounter: 1.791 m (5' 10.5\").    Weight as of this encounter: 80.8 kg (178 lb 3.2 oz).  Medication Reconciliation: complete     ESS 2  Neck 38cm  Urvashi Woods    "

## 2017-10-11 ENCOUNTER — TRANSFERRED RECORDS (OUTPATIENT)
Dept: HEALTH INFORMATION MANAGEMENT | Facility: CLINIC | Age: 62
End: 2017-10-11

## 2017-11-06 DIAGNOSIS — R06.83 SNORING: Primary | ICD-10-CM

## 2017-11-06 DIAGNOSIS — R53.83 FATIGUE, UNSPECIFIED TYPE: ICD-10-CM

## 2017-11-13 ENCOUNTER — OFFICE VISIT (OUTPATIENT)
Dept: SLEEP MEDICINE | Facility: CLINIC | Age: 62
End: 2017-11-13
Payer: COMMERCIAL

## 2017-11-13 DIAGNOSIS — R53.83 FATIGUE, UNSPECIFIED TYPE: ICD-10-CM

## 2017-11-13 DIAGNOSIS — R06.83 SNORING: Primary | ICD-10-CM

## 2017-11-13 PROCEDURE — G0399 HOME SLEEP TEST/TYPE 3 PORTA: HCPCS | Performed by: INTERNAL MEDICINE

## 2017-11-13 NOTE — MR AVS SNAPSHOT
After Visit Summary   11/13/2017    Cheko Rodarte    MRN: [id number]           Patient Information     Date Of Birth          1955        Visit Information        Provider Department      11/13/2017 2:00 PM BED 7 [facility]        Today's Diagnoses     Snoring    -  1       Follow-ups after your visit        Your next 10 appointments already scheduled     Nov 14, 2017  9:30 AM CST   HST Drop Off with [facility] DME   [facility] ([facility])    [address]  [facility] [address] [phone]            Nov 20, 2017  1:30 PM CST   Return Sleep Patient with Lazaro Cochran MD   [facility] ([facility])    [address]  [facility] [address] [phone]              Who to contact     If you have questions or need follow up information about today's clinic visit or your schedule please contact [facility] directly at [phone].  Normal or non-critical lab and imaging results will be communicated to you by [employer]art, letter or phone within 4 business days after the clinic has received the results. If you do not hear from us within 7 days, please contact the clinic through Stockle[employer] or phone. If you have a critical or abnormal lab result, we will notify you by phone as soon as possible.  Submit refill requests through [employer] or call your pharmacy and they will forward the refill request to us. Please allow 3 business days for your refill to be completed.          Additional Information About Your Visit        Precision[employer]khart Information     [employer] gives you secure access to your electronic health record. If you see a primary care provider, you can also send messages to your care team and make appointments. If you have questions, please call your primary care clinic.  If you do not have a primary care provider, please call [phone] and they  will assist you.        Care EveryWhere ID     This is your Care EveryWhere ID. This could be used by other organizations to access your [place] medical records  [id number]         Blood Pressure from Last 3 Encounters:   10/02/17 101/61   09/12/17 96/62   07/21/17 105/68    Weight from Last 3 Encounters:   10/02/17 80.8 kg (178 lb 3.2 oz)   09/12/17 79.4 kg (175 lb)   07/21/17 80.7 kg (178 lb)              Today, you had the following     No orders found for display       Primary Care Provider Office Phone # Fax #    Esteban ASHOK Herron [phone]       [address] [facility] [address]        Equal Access to Services     MARTHA CLARKE : Hadii yahaira mayso Somack, waaxda luqadaha, qaybta kaalmada adeegyada, naseem casey. So [place] [phone].    ATENCIÓN: Si habla español, tiene a orlando disposición servicios gratuitos de asistencia lingüística. [facility] [phone].    We comply with applicable federal civil rights laws and [place] laws. We do not discriminate on the basis of race, color, national origin, age, disability, sex, sexual orientation, or gender identity.            Thank you!     Thank you for choosing [facility]  for your care. Our goal is always to provide you with excellent care. Hearing back from our patients is one way we can continue to improve our services. Please take a few minutes to complete the written survey that you may receive in the mail after your visit with us. Thank you!             Your Updated Medication List - Protect others around you: Learn how to safely use, store and throw away your medicines at www.disposemymeds.org.          This list is accurate as of: 11/13/17  2:18 PM.  Always use your most recent med list.                   Brand Name Dispense Instructions for use Diagnosis    azithromycin 250 MG tablet    ZITHROMAX    6 tablet    Two tablets first day, then one tablet daily for four days.     Acute bronchitis with symptoms > 10 days       cholecalciferol 2000 UNITS tablet     90 tablet    Take 2 tablets by mouth daily    Hyperlipidemia LDL goal <160       citalopram 20 MG tablet    celeXA    90 tablet    Take 1 tablet (20 mg) by mouth daily    Insomnia, unspecified type       Co Q 10 100 MG Caps      Take 300 mg by mouth        KRILL OIL PO      Take by mouth daily        PROSTATE HEALTH PO           simvastatin 10 MG tablet    ZOCOR    90 tablet    TAKE 1 TABLET BY MOUTH DAILY AT BEDTIME    Hyperlipidemia LDL goal <160

## 2017-11-13 NOTE — PROGRESS NOTES
Patient instructed on HST use. Patient demonstrated and verbalized knowledge of use. Device programmed to start at 10pm. Device will be returned tomorrow before noon.    Vy Lopez  Sleep Clinic - Specialist

## 2017-11-14 ENCOUNTER — DOCUMENTATION ONLY (OUTPATIENT)
Dept: SLEEP MEDICINE | Facility: CLINIC | Age: 62
End: 2017-11-14

## 2017-11-14 NOTE — NURSING NOTE
HST drop off  Download successful. Routed for scoring.    Vy TreadwellLopez  Sleep Clinic - Specialist

## 2017-11-14 NOTE — PROCEDURES
"HOME SLEEP STUDY INTERPRETATION    Patient: Cheko Rodarte  MRN: [id number]  YOB: 1955  Study Date: 11/13/2017  Referring Provider: Esteban Herron;   Ordering Provider: Lazaro Cochran MD, MD     Indications for Home Study: Cheko Rodarte is a 62 year old male with a history of anxiety who presents with symptoms suggestive of obstructive sleep apnea.    Estimated body mass index is 25.21 kg/(m^2) as calculated from the following:    Height as of 10/2/17: 1.791 m (5' 10.5\").    Weight as of 10/2/17: 80.8 kg (178 lb 3.2 oz).  Total score - [place]: 2 (10/2/2017  1:00 PM)  STOP-BANG: 3/8    Data: A full night home sleep study was performed recording the standard physiologic parameters including body position, movement, sound, nasal pressure, thermal oral airflow, chest and abdominal movements with respiratory inductance plethysmography, and oxygen saturation by pulse oximetry. Pulse rate was estimated by oximetry recording. This study was considered adequate based on > 4 hours of quality oximetry and respiratory recording. As specified by the AASM Manual for the Scoring of Sleep and Associated events, version 2.3, Rule VIII.D 1B, 4% oxygen desaturation scoring for hypopneas is used as a standard of care on all home sleep apnea testing.    Analysis Time:  474 minutes    Respiration:   Sleep Associated Hypoxemia: sustained hypoxemia was present. Baseline oxygen saturation was 92.7%.  Time with saturation less than or equal to 88% was 7.8 minutes. The lowest oxygen saturation was 84%.   Snoring: Snoring was present.  Respiratory events: The home study revealed a presence of 52 obstructive apneas and 1 mixed and 9 central apneas. There were 21 hypopneas resulting in a combined apnea/hypopnea index [AHI] of 10.5 events per hour.  AHI was 38.7 per hour supine, 0 per hour prone, 2.6 per hour on left side, and 22.6 per hour on right side.   Pattern: Excluding events noted above, respiratory rate and pattern was " Normal.    Position: Percent of time spent: supine - 18.8%, prone - 2.9%, on left - 71.8%, on right - 5.6%.    Heart Rate: By pulse oximetry normal rate was noted.     Assessment:   Mild obstructive sleep apnea with supine predominance.  Sleep associated hypoxemia was present.     Recommendations:  Depending on clinical indications, consider oral appliance therapy. Auto PAP therapy is an options if significant medical comorbidity or daytime sleepiness is present.   Suggest optimizing sleep hygiene and avoiding sleep deprivation.  Weight management.    Diagnosis Code(s): Obstructive Sleep Apnea G47.33, Hypoxemia G47.36    Lazaro Cochran MD, MD, November 14, 2017   Diplomate, American Board of Psychiatry and Neurology, Sleep Medicine

## 2017-11-14 NOTE — PROGRESS NOTES
This HST performed using a Noxturnal T3 device which recorded snore sound, movement activity, body position, nasal pressure, oronasal thermal airflow, pulse, oximetry, both chest and abdominal respiratory effort. Patient was scored using 1B 4% rule.

## 2017-11-20 ENCOUNTER — OFFICE VISIT (OUTPATIENT)
Dept: SLEEP MEDICINE | Facility: CLINIC | Age: 62
End: 2017-11-20
Payer: COMMERCIAL

## 2017-11-20 VITALS
HEART RATE: 72 BPM | BODY MASS INDEX: 25.06 KG/M2 | RESPIRATION RATE: 16 BRPM | HEIGHT: 71 IN | OXYGEN SATURATION: 97 % | SYSTOLIC BLOOD PRESSURE: 112 MMHG | WEIGHT: 179 LBS | DIASTOLIC BLOOD PRESSURE: 68 MMHG

## 2017-11-20 DIAGNOSIS — G47.33 OSA (OBSTRUCTIVE SLEEP APNEA): Primary | ICD-10-CM

## 2017-11-20 DIAGNOSIS — G47.00 INSOMNIA, UNSPECIFIED TYPE: ICD-10-CM

## 2017-11-20 PROCEDURE — 99214 OFFICE O/P EST MOD 30 MIN: CPT | Performed by: INTERNAL MEDICINE

## 2017-11-20 NOTE — MR AVS SNAPSHOT
After Visit Summary   11/20/2017    Cheko Rodarte    MRN: [id number]           Patient Information     Date Of Birth          1955        Visit Information        Provider Department      11/20/2017 1:30 PM Lazaro Cochran MD [facility]        Today's Diagnoses     TALI (obstructive sleep apnea)    -  1    Insomnia, unspecified type          Care Instructions      Your BMI is Body mass index is 25.32 kg/(m^2).  Weight management is a personal decision.  If you are interested in exploring weight loss strategies, the following discussion covers the approaches that may be successful. Body mass index (BMI) is one way to tell whether you are at a healthy weight, overweight, or obese. It measures your weight in relation to your height.  A BMI of 18.5 to 24.9 is in the healthy range. A person with a BMI of 25 to 29.9 is considered overweight, and someone with a BMI of 30 or greater is considered obese. More than two-thirds of American adults are considered overweight or obese.  Being overweight or obese increases the risk for further weight gain. Excess weight may lead to heart disease and diabetes.  Creating and following plans for healthy eating and physical activity may help you improve your health.  Weight control is part of healthy lifestyle and includes exercise, emotional health, and healthy eating habits. Careful eating habits lifelong are the mainstay of weight control. Though there are significant health benefits from weight loss, long-term weight loss with diet alone may be very difficult to achieve- studies show long-term success with dietary management in less than 10% of people. Attaining a healthy weight may be especially difficult to achieve in those with severe obesity. In some cases, medications, devices and surgical management might be considered.  What can you do?  If you are overweight or obese and are interested in methods for weight loss, you should discuss this with  your provider.     Consider reducing daily calorie intake by 500 calories.     Keep a food journal.     Avoiding skipping meals, consider cutting portions instead.    Diet combined with exercise helps maintain muscle while optimizing fat loss. Strength training is particularly important for building and maintaining muscle mass. Exercise helps reduce stress, increase energy, and improves fitness. Increasing exercise without diet control, however, may not burn enough calories to loose weight.       Start walking three days a week 10-20 minutes at a time    Work towards walking thirty minutes five days a week     Eventually, increase the speed of your walking for 1-2 minutes at time    In addition, we recommend that you review healthy lifestyles and methods for weight loss available through the National Institutes of Health patient information sites:  http://win.niddk.nih.gov/publications/index.htm    And look into health and wellness programs that may be available through your health insurance provider, employer, local community center, or jeromy club.    Weight management plan: Patient was referred to their PCP to discuss a diet and exercise plan.              Follow-ups after your visit        Who to contact     If you have questions or need follow up information about today's clinic visit or your schedule please contact [facility] directly at [phone].  Normal or non-critical lab and imaging results will be communicated to you by MyChart, letter or phone within 4 business days after the clinic has received the results. If you do not hear from us within 7 days, please contact the clinic through [employer] or phone. If you have a critical or abnormal lab result, we will notify you by phone as soon as possible.  Submit refill requests through [employer] or call your pharmacy and they will forward the refill request to us. Please allow 3 business days for your refill to be completed.          Additional  "Information About Your Visit        MyChart Information     [employer] gives you secure access to your electronic health record. If you see a primary care provider, you can also send messages to your care team and make appointments. If you have questions, please call your primary care clinic.  If you do not have a primary care provider, please call [phone] and they will assist you.        Care EveryWhere ID     This is your Care EveryWhere ID. This could be used by other organizations to access your [place] medical records  [id number]        Your Vitals Were     Pulse Respirations Height Pulse Oximetry BMI (Body Mass Index)       72 16 1.791 m (5' 10.5\") 97% 25.32 kg/m2        Blood Pressure from Last 3 Encounters:   11/20/17 112/68   10/02/17 101/61   09/12/17 96/62    Weight from Last 3 Encounters:   11/20/17 81.2 kg (179 lb)   10/02/17 80.8 kg (178 lb 3.2 oz)   09/12/17 79.4 kg (175 lb)              We Performed the Following     Comprehensive DME        Primary Care Provider Office Phone # Fax #    Esteban PULIDO MD [facility] [phone]       [address] [place] [id number]        Equal Access to Services     MARTHA CLARKE AH: Hadii aad ku hadasho Soomaali, waaxda luqadaha, qaybta kaalmada adeegyada, waxay marcellain haysophien anders warner laelan ah. So [facility] [phone].    ATENCIÓN: Si habla español, tiene a orlando disposición servicios gratuitos de asistencia lingüística. Llame al [phone].    We comply with applicable federal civil rights laws and [place] laws. We do not discriminate on the basis of race, color, national origin, age, disability, sex, sexual orientation, or gender identity.            Thank you!     Thank you for choosing [facility]  for your care. Our goal is always to provide you with excellent care. Hearing back from our patients is one way we can continue to improve our services. Please take a few minutes to complete the written survey that you may receive " in the mail after your visit with us. Thank you!             Your Updated Medication List - Protect others around you: Learn how to safely use, store and throw away your medicines at www.disposemymeds.org.          This list is accurate as of: 11/20/17  2:08 PM.  Always use your most recent med list.                   Brand Name Dispense Instructions for use Diagnosis    azithromycin 250 MG tablet    ZITHROMAX    6 tablet    Two tablets first day, then one tablet daily for four days.    Acute bronchitis with symptoms > 10 days       cholecalciferol 2000 UNITS tablet     90 tablet    Take 2 tablets by mouth daily    Hyperlipidemia LDL goal <160       citalopram 20 MG tablet    celeXA    90 tablet    Take 1 tablet (20 mg) by mouth daily    Insomnia, unspecified type       Co Q 10 100 MG Caps      Take 300 mg by mouth        KRILL OIL PO      Take by mouth daily        PROSTATE HEALTH PO           simvastatin 10 MG tablet    ZOCOR    90 tablet    TAKE 1 TABLET BY MOUTH DAILY AT BEDTIME    Hyperlipidemia LDL goal <160

## 2017-11-20 NOTE — PROGRESS NOTES
Sleep Study Follow-Up Visit:    Date on this visit: 11/20/2017    Cheko Rodarte comes in today for follow-up of his home sleep study done on 11/13/2017 at the [facility] for possible sleep apnea.    Respiratory events: The home study revealed a presence of 52 obstructive apneas and 1 mixed and 9 central apneas. There were 21 hypopneas resulting in a combined apnea/hypopnea index [AHI] of 10.5 events per hour.  AHI was 38.7 per hour supine, 0 per hour prone, 2.6 per hour on left side, and 22.6 per hour on right side.   Pattern: Excluding events noted above, respiratory rate and pattern was Normal.    Position: Percent of time spent: supine - 18.8%, prone - 2.9%, on left - 71.8%, on right - 5.6%.    Sleep Associated Hypoxemia: sustained hypoxemia was present. Baseline oxygen saturation was 92.7%.  Time with saturation less than or equal to 88% was 7.8 minutes. The lowest oxygen saturation was 84%.   Snoring: Snoring was present.     Heart Rate: By pulse oximetry normal rate was noted.      Assessment:   Mild obstructive sleep apnea with supine predominance.  Sleep associated hypoxemia was present.     These findings were reviewed with patient.     Past medical/surgical history, family history, social history, medications and allergies were reviewed.      Problem List:  Patient Active Problem List    Diagnosis Date Noted     Health Care Home 02/03/2012     Priority: High     x  DX V65.8 REPLACED WITH 79810 HEALTH CARE HOME (04/08/2013)       Medial epicondylitis of right elbow 08/26/2014     Priority: Medium     History of prostatitis 04/15/2014     Priority: Medium     Advanced directives, counseling/discussion 02/20/2013     Priority: Medium     Discussed advance care planning with patient; information given to patient to review. 2/20/2013          HYPERLIPIDEMIA LDL GOAL <160 10/31/2010     Priority: Medium     Snoring 04/27/2010     Priority: Medium     History of nephrectomy, unilateral  04/27/2010     Priority: Medium     left       Insomnia 01/31/2006     Priority: Medium     Problem list name updated by automated process. Provider to review       Aneurysm of thoracic aorta (H) 08/30/2005     Priority: Medium     Problem list name updated by automated process. Provider to review       Anxiety state 08/16/2004     Priority: Medium     Problem list name updated by automated process. Provider to review          Impression/Plan:    1. Mild obstructive sleep apnea   2. Insomnia     - Patient was counseled regarding mild sleep apnea, consequences and treatment options. Patient estimates that he only slept for about 6 hrs on night of HST compared to 8 hrs of analysis time, We could be underestimating severity of his sleep apnea.     - We discussed auto PAP therapy, dental appliance or upper airway surgery. Patient opts to start CPAP.     - We discussed optimization of sleep hygiene and stimulus control for his insomnia.     Plan:     1.  Start auto PAP therapy 5-15 cm H2O      He will follow up with me in about 6 week(s).     Twenty-five minutes spent with patient, all of which were spent face-to-face counseling, consulting, coordinating plan of care.      Lazaro Cochran MD     CC: Esteban Herron

## 2017-11-20 NOTE — NURSING NOTE
"Chief Complaint   Patient presents with     Study Results     Follow up psg       Initial /68  Pulse 72  Resp 16  Ht 1.791 m (5' 10.5\")  Wt 81.2 kg (179 lb)  SpO2 97%  BMI 25.32 kg/m2 Estimated body mass index is 25.32 kg/(m^2) as calculated from the following:    Height as of this encounter: 1.791 m (5' 10.5\").    Weight as of this encounter: 81.2 kg (179 lb).  Medication Reconciliation: complete   ESS 2  Whit Sosa MA        "

## 2017-11-20 NOTE — PATIENT INSTRUCTIONS

## 2017-11-29 ENCOUNTER — TELEPHONE (OUTPATIENT)
Dept: SLEEP MEDICINE | Facility: CLINIC | Age: 62
End: 2017-11-29

## 2017-11-29 DIAGNOSIS — G47.00 INSOMNIA, UNSPECIFIED TYPE: ICD-10-CM

## 2017-11-30 NOTE — TELEPHONE ENCOUNTER
Citalopram       Last Written Prescription Date: 7/12/16  Last Fill Quantity: 90,  # refills: 3   Last Office Visit with [facility] or [facility] prescribing provider: 9/12/17                                           Requested Prescriptions   Pending Prescriptions Disp Refills     citalopram (CELEXA) 20 MG tablet [Pharmacy Med Name: CITALOPRAM HBR 20 MG TABLET] 90 tablet 3     Sig: TAKE 1 TABLET (20 MG) BY MOUTH DAILY    SSRIs Protocol Passed    11/29/2017  9:34 PM       Passed - Recent or future visit with authorizing provider    Patient had office visit in the last year or has a visit in the next 30 days with authorizing provider.  See chart review.              Passed - Medication is NOT Bupropion    If the medication is Bupropion (Wellbutrin), and the patient is taking for smoking cessation; OK to refill.         Passed - Patient is age 18 or older        PHQ-9 SCORE 9/28/2010   Total Score 7     No flowsheet data found.

## 2017-11-30 NOTE — TELEPHONE ENCOUNTER
Break in medication per last written date and quantity.   Celiro message sent to patient needing clarification.  Awaiting response    Jena Walker RN

## 2017-12-04 NOTE — TELEPHONE ENCOUNTER
Left message on answering machine for patient to call back.    Dx is insomnia    Marlene Vaughan,MARIAA  [facility]  [phone]

## 2017-12-04 NOTE — TELEPHONE ENCOUNTER
Patient called back and notes that he has been taking this medication regularly as he had decreased the dose to half a dose for awhile and had discussed with provider when he did that however did up the dose back to a full tablet.  Routing refill request to provider for review/approval because:  A break in medication  Destinee Elena RN - Triage  [facility]

## 2017-12-05 ENCOUNTER — DOCUMENTATION ONLY (OUTPATIENT)
Dept: SLEEP MEDICINE | Facility: CLINIC | Age: 62
End: 2017-12-05

## 2017-12-05 RX ORDER — CITALOPRAM HYDROBROMIDE 20 MG/1
TABLET ORAL
Qty: 90 TABLET | Refills: 3 | Status: SHIPPED | OUTPATIENT
Start: 2017-12-05 | End: 2018-09-04

## 2017-12-05 NOTE — TELEPHONE ENCOUNTER
Pt called back to check the status of the refill, states he is leaving town for 9 days and can't be without the medication. Dr Herron is out of the office. Dr Jaime please review and advise. Thank you.  Zelda Gillespie,

## 2017-12-05 NOTE — PROGRESS NOTES
Patient was offered choice of vendor and chose [facility].  Patient Cheko Rodarte was set up at [place] on December 5, 2017. Patient received a Resmed AirSense 10 Auto. Pressures were set at 5-15 cm H2O.   Patient s ramp is 5 cm H2O for Auto and FLEX/EPR is 2.  Patient received a Resmed Mask name: AIRFIT N20  Nasal mask Size Medium, heated tubing and heated humidifier.  Patient is enrolled in the STM Program and does not need to meet compliance. Patient has a follow up on 1/26/18 with Dr. Cochran.    Alannah Walton

## 2017-12-08 ENCOUNTER — DOCUMENTATION ONLY (OUTPATIENT)
Dept: SLEEP MEDICINE | Facility: CLINIC | Age: 62
End: 2017-12-08

## 2017-12-08 NOTE — PROGRESS NOTES
3 DAY STM VISIT    Patient contacted for 3 day STM visit  Subjective measures:  Pt is currently on a business trip and did not bring it with him.       Device type: Auto-CPAP  PAP settings: CPAP min 5 cm  H20     CPAP max 15 cm  H20       Assessment: No usage reporting.  Action plan: Pt to have f/u 14 day STM visit.  Diagnostic AHI:   10.5

## 2017-12-20 ENCOUNTER — DOCUMENTATION ONLY (OUTPATIENT)
Dept: SLEEP MEDICINE | Facility: CLINIC | Age: 62
End: 2017-12-20
Payer: COMMERCIAL

## 2017-12-20 NOTE — PROGRESS NOTES
14 DAY STM VISIT    Subjective measures:   Patient just bought a SoClean he likes his machine and feels a lot better with his improved sleep.     Assessment: Pt meeting objective benchmarks.  Patient meeting subjective benchmarks.   Action plan: Pt to have 30 day STM visit.    Device type: Auto-CPAP  PAP settings: CPAP min 5 cm  H20     CPAP max 15 cm  H20    95th% pressure 10.1 cm  H20   Objective measures: 14 day rolling measures      Compliance  78 %      Leak  17.7 lpm  last  upload      AHI 2.12   last  upload      Average number of minutes 357    Diagnostic AHI: 10.5    Objective measure goal  Compliance   Goal >70%  Leak   Goal < 24 lpm  AHI  Goal < 5  Usage  Goal >240

## 2018-01-05 ENCOUNTER — DOCUMENTATION ONLY (OUTPATIENT)
Dept: SLEEP MEDICINE | Facility: CLINIC | Age: 63
End: 2018-01-05

## 2018-01-05 NOTE — PROGRESS NOTES
30 DAY STM VISIT    Subjective measures:   Pt states things are going well and has no issues or complaints.  Pt is benefiting from therapy.      Assessment: Pt meeting objective benchmarks.  Patient meeting subjective benchmarks.   Action plan: pt to have 6 month STM visit  Patient has a follow up visit with Dr. Cochran on 1/26/2018.   Device type: Auto-CPAP  PAP settings: CPAP min 5 cm  H20     CPAP max 15 cm  H20    95th% pressure 10.5 cm  H20   Objective measures: 14 day rolling measures      Compliance  92 %      Leak  22 lpm  last  upload      AHI 3.11   last  upload      Average number of minutes 438    Diagnostic AHI:   10.5        Objective measure goal  Compliance   Goal >70%  Leak   Goal < 24 lpm  AHI  Goal < 5  Usage  Goal >240

## 2018-01-05 NOTE — PROGRESS NOTES
30 DAY [facility] VISIT    {subjective measures:356241}     Assessment: Pt meeting objective benchmarks.  {Subjective measures pass/fail:027125} {subjective benchmarks failin}  Action plan: {action items:838623}  Patient has a follow up visit with Dr. Cochran on 18.   Device type: Auto-CPAP  PAP settings: CPAP min 5 cm  H20     CPAP max 15 cm  H20    95th% pressure 10.5 cm  H20   Objective measures: 14 day rolling measures      Compliance  92 %      Leak  22 lpm  last  upload      AHI 3.11   last  upload      Average number of minutes 438    Diagnostic AHI:10.5        Objective measure goal  Compliance   Goal >70%  Leak   Goal < 24 lpm  AHI  Goal < 5  Usage  Goal >240

## 2018-01-26 ENCOUNTER — OFFICE VISIT (OUTPATIENT)
Dept: SLEEP MEDICINE | Facility: CLINIC | Age: 63
End: 2018-01-26
Payer: COMMERCIAL

## 2018-01-26 VITALS
RESPIRATION RATE: 16 BRPM | SYSTOLIC BLOOD PRESSURE: 112 MMHG | OXYGEN SATURATION: 98 % | DIASTOLIC BLOOD PRESSURE: 75 MMHG | HEART RATE: 68 BPM | BODY MASS INDEX: 25.76 KG/M2 | HEIGHT: 71 IN | WEIGHT: 184 LBS

## 2018-01-26 DIAGNOSIS — G47.33 OSA (OBSTRUCTIVE SLEEP APNEA): Primary | ICD-10-CM

## 2018-01-26 PROCEDURE — 99213 OFFICE O/P EST LOW 20 MIN: CPT | Performed by: INTERNAL MEDICINE

## 2018-01-26 NOTE — MR AVS SNAPSHOT
After Visit Summary   1/26/2018    Cheko Rodarte    MRN: [id number]           Patient Information     Date Of Birth          1955        Visit Information        Provider Department      1/26/2018 11:00 AM Lazaro Cochran MD [facility] [place]        Today's Diagnoses     TALI (obstructive sleep apnea)    -  1      Care Instructions        Your Body mass index is 26.03 kg/(m^2).  Weight management is a personal decision.  If you are interested in exploring weight loss strategies, the following discussion covers the approaches that may be successful. Body mass index (BMI) is one way to tell whether you are at a healthy weight, overweight, or obese. It measures your weight in relation to your height.  A BMI of 18.5 to 24.9 is in the healthy range. A person with a BMI of 25 to 29.9 is considered overweight, and someone with a BMI of 30 or greater is considered obese. More than two-thirds of American adults are considered overweight or obese.  Being overweight or obese increases the risk for further weight gain. Excess weight may lead to heart disease and diabetes.  Creating and following plans for healthy eating and physical activity may help you improve your health.  Weight control is part of healthy lifestyle and includes exercise, emotional health, and healthy eating habits. Careful eating habits lifelong are the mainstay of weight control. Though there are significant health benefits from weight loss, long-term weight loss with diet alone may be very difficult to achieve- studies show long-term success with dietary management in less than 10% of people. Attaining a healthy weight may be especially difficult to achieve in those with severe obesity. In some cases, medications, devices and surgical management might be considered.  What can you do?  If you are overweight or obese and are interested in methods for weight loss, you should discuss this with your provider.     Consider reducing  daily calorie intake by 500 calories.     Keep a food journal.     Avoiding skipping meals, consider cutting portions instead.    Diet combined with exercise helps maintain muscle while optimizing fat loss. Strength training is particularly important for building and maintaining muscle mass. Exercise helps reduce stress, increase energy, and improves fitness. Increasing exercise without diet control, however, may not burn enough calories to loose weight.       Start walking three days a week 10-20 minutes at a time    Work towards walking thirty minutes five days a week     Eventually, increase the speed of your walking for 1-2 minutes at time    In addition, we recommend that you review healthy lifestyles and methods for weight loss available through the National Institutes of Health patient information sites:  http://win.niddk.nih.gov/publications/index.htm    And look into health and wellness programs that may be available through your health insurance provider, employer, local community center, or jeromy club.    Weight management plan: Patient was referred to their PCP to discuss a diet and exercise plan.            Follow-ups after your visit        Who to contact     If you have questions or need follow up information about today's clinic visit or your schedule please contact [facility] directly at [phone].  Normal or non-critical lab and imaging results will be communicated to you by MyChart, letter or phone within 4 business days after the clinic has received the results. If you do not hear from us within 7 days, please contact the clinic through [employer] or phone. If you have a critical or abnormal lab result, we will notify you by phone as soon as possible.  Submit refill requests through SupportLocal or call your pharmacy and they will forward the refill request to us. Please allow 3 business days for your refill to be completed.          Additional Information About Your Visit       "  MyChart Information     FetchBackt gives you secure access to your electronic health record. If you see a primary care provider, you can also send messages to your care team and make appointments. If you have questions, please call your primary care clinic.  If you do not have a primary care provider, please call [phone] and they will assist you.        Care EveryWhere ID     This is your Care EveryWhere ID. This could be used by other organizations to access your [place] medical records  [id number]        Your Vitals Were     Pulse Respirations Height Pulse Oximetry BMI (Body Mass Index)       68 16 1.791 m (5' 10.5\") 98% 26.03 kg/m2        Blood Pressure from Last 3 Encounters:   01/26/18 112/75   11/20/17 112/68   10/02/17 101/61    Weight from Last 3 Encounters:   01/26/18 83.5 kg (184 lb)   11/20/17 81.2 kg (179 lb)   10/02/17 80.8 kg (178 lb 3.2 oz)              Today, you had the following     No orders found for display         Today's Medication Changes          These changes are accurate as of 1/26/18 11:40 AM.  If you have any questions, ask your nurse or doctor.               Stop taking these medicines if you haven't already. Please contact your care team if you have questions.     azithromycin 250 MG tablet   Commonly known as:  ZITHROMAX                    Primary Care Provider Office Phone # Fax #    Esteban PULIDO MD [facility] [phone]       [address] [place] [address]        Equal Access to Services     [facility] TRICIA : Hadii aad ku hadasho Soomaali, waaxda luqadaha, qaybta kaalmada adeegyada, naseem casey. So [facility] [phone].    ATENCIÓN: Si habla español, tiene a orlando disposición servicios gratuitos de asistencia lingüística. Llame al [phone].    We comply with applicable federal civil rights laws and [place] laws. We do not discriminate on the basis of race, color, national origin, age, disability, sex, sexual orientation, or " gender identity.            Thank you!     Thank you for choosing [facility]  for your care. Our goal is always to provide you with excellent care. Hearing back from our patients is one way we can continue to improve our services. Please take a few minutes to complete the written survey that you may receive in the mail after your visit with us. Thank you!             Your Updated Medication List - Protect others around you: Learn how to safely use, store and throw away your medicines at www.disposemymeds.org.          This list is accurate as of 1/26/18 11:40 AM.  Always use your most recent med list.                   Brand Name Dispense Instructions for use Diagnosis    cholecalciferol 2000 UNITS tablet     90 tablet    Take 2 tablets by mouth daily    Hyperlipidemia LDL goal <160       citalopram 20 MG tablet    celeXA    90 tablet    TAKE 1 TABLET (20 MG) BY MOUTH DAILY    Insomnia, unspecified type       Co Q 10 100 MG Caps      Take 300 mg by mouth        KRILL OIL PO      Take by mouth daily        PROSTATE HEALTH PO           simvastatin 10 MG tablet    ZOCOR    90 tablet    TAKE 1 TABLET BY MOUTH DAILY AT BEDTIME    Hyperlipidemia LDL goal <160

## 2018-01-26 NOTE — NURSING NOTE
"Chief Complaint   Patient presents with     CPAP Follow Up     Follow up aggie       Initial /75  Pulse 68  Resp 16  Ht 1.791 m (5' 10.5\")  Wt 83.5 kg (184 lb)  SpO2 98%  BMI 26.03 kg/m2 Estimated body mass index is 26.03 kg/(m^2) as calculated from the following:    Height as of this encounter: 1.791 m (5' 10.5\").    Weight as of this encounter: 83.5 kg (184 lb).  Medication Reconciliation: complete   ESS 1  Whit Sosa MA      "

## 2018-01-26 NOTE — PROGRESS NOTES
Obstructive Sleep Apnea - PAP Follow-Up Visit:    Chief Complaint   Patient presents with     CPAP Follow Up     Follow up aggie       Cheko Rodarte comes in today for follow-up of their mild sleep apnea, managed with CPAP.     Overall, he rates the experience with PAP as 10 (0 poor, 10 great). The mask is comfortable. The mask is not leaking.  He is not snoring with the mask on. He is not having gasp arousals.  He is not having significant oral/nasal dryness. The pressure settings are comfortable.     He uses nasal mask.     Bedtime is typically 9:30 pm. Usually it takes about 30-60 minutes to fall asleep with the mask on. Wake time is typically 6:30 am.  Patient is using PAP therapy 8 hours per night. The patient is usually getting 8 hours of sleep per night.    He does feel rested in the morning.    Total score - [place]: 1 (1/26/2018 11:05 AM)    ResMed     Auto-PAP 5-15 cmH2O download:  30/30 total days of use. 0 nonuse days. 100% days with >4 hours use.  Average use 8 hours 21 minutes per day. Median Leak 2.6 L/min. 95%ile Leak 22 L/min. CPAP 95% pressure 10.3cm. AHI 1.3      Reviewed by team: Allergies  Meds       Reviewed by provider:        Problem List:  Patient Active Problem List    Diagnosis Date Noted     Health Care Home 02/03/2012     Priority: High     x  DX V65.8 REPLACED WITH 82536 HEALTH CARE HOME (04/08/2013)       Medial epicondylitis of right elbow 08/26/2014     Priority: Medium     History of prostatitis 04/15/2014     Priority: Medium     Advanced directives, counseling/discussion 02/20/2013     Priority: Medium     Discussed advance care planning with patient; information given to patient to review. 2/20/2013          HYPERLIPIDEMIA LDL GOAL <160 10/31/2010     Priority: Medium     Snoring 04/27/2010     Priority: Medium     History of nephrectomy, unilateral 04/27/2010     Priority: Medium     left       Insomnia 01/31/2006     Priority: Medium     Problem list name updated by  "automated process. Provider to review       Aneurysm of thoracic aorta (H) 08/30/2005     Priority: Medium     Problem list name updated by automated process. Provider to review       Anxiety state 08/16/2004     Priority: Medium     Problem list name updated by automated process. Provider to review            /75  Pulse 68  Resp 16  Ht 1.791 m (5' 10.5\")  Wt 83.5 kg (184 lb)  SpO2 98%  BMI 26.03 kg/m2    Impression/Plan:     Mild sleep apnea.     - Tolerating PAP well. Daytime symptoms are improved. Regular compliance and normal AHI seen on download.     Plan:     1. Continue auto PAP therapy     Cheko Rodarte will follow up in about 1 year(s).     Fifteen minutes spent with patient, all of which were spent face-to-face counseling, consulting, coordinating plan of care.      Lazaro Cochran MD, MD    CC:  Esteban Herron,   "

## 2018-01-26 NOTE — PATIENT INSTRUCTIONS
Your Body mass index is 26.03 kg/(m^2).  Weight management is a personal decision.  If you are interested in exploring weight loss strategies, the following discussion covers the approaches that may be successful. Body mass index (BMI) is one way to tell whether you are at a healthy weight, overweight, or obese. It measures your weight in relation to your height.  A BMI of 18.5 to 24.9 is in the healthy range. A person with a BMI of 25 to 29.9 is considered overweight, and someone with a BMI of 30 or greater is considered obese. More than two-thirds of American adults are considered overweight or obese.  Being overweight or obese increases the risk for further weight gain. Excess weight may lead to heart disease and diabetes.  Creating and following plans for healthy eating and physical activity may help you improve your health.  Weight control is part of healthy lifestyle and includes exercise, emotional health, and healthy eating habits. Careful eating habits lifelong are the mainstay of weight control. Though there are significant health benefits from weight loss, long-term weight loss with diet alone may be very difficult to achieve- studies show long-term success with dietary management in less than 10% of people. Attaining a healthy weight may be especially difficult to achieve in those with severe obesity. In some cases, medications, devices and surgical management might be considered.  What can you do?  If you are overweight or obese and are interested in methods for weight loss, you should discuss this with your provider.     Consider reducing daily calorie intake by 500 calories.     Keep a food journal.     Avoiding skipping meals, consider cutting portions instead.    Diet combined with exercise helps maintain muscle while optimizing fat loss. Strength training is particularly important for building and maintaining muscle mass. Exercise helps reduce stress, increase energy, and improves fitness.  Increasing exercise without diet control, however, may not burn enough calories to loose weight.       Start walking three days a week 10-20 minutes at a time    Work towards walking thirty minutes five days a week     Eventually, increase the speed of your walking for 1-2 minutes at time    In addition, we recommend that you review healthy lifestyles and methods for weight loss available through the National Institutes of Health patient information sites:  http://win.niddk.nih.gov/publications/index.htm    And look into health and wellness programs that may be available through your health insurance provider, employer, local community center, or jeromy club.    Weight management plan: Patient was referred to their PCP to discuss a diet and exercise plan.

## 2018-03-06 DIAGNOSIS — G47.00 INSOMNIA, UNSPECIFIED TYPE: ICD-10-CM

## 2018-03-06 DIAGNOSIS — E78.5 HYPERLIPIDEMIA LDL GOAL <160: ICD-10-CM

## 2018-03-06 RX ORDER — CITALOPRAM HYDROBROMIDE 20 MG/1
TABLET ORAL
Qty: 90 TABLET | Refills: 3 | Status: CANCELLED | OUTPATIENT
Start: 2018-03-06

## 2018-03-06 RX ORDER — SIMVASTATIN 10 MG
TABLET ORAL
Qty: 90 TABLET | Refills: 3 | Status: CANCELLED | OUTPATIENT
Start: 2018-03-06

## 2018-03-06 NOTE — TELEPHONE ENCOUNTER
Reason for Call:  Other prescription    Detailed comments: Pt called this afternoon and would like a refill on his citalopram and his zocor. HOWEVER. The pt would like them both to go to separate pharmacies due to it being cheaper this way. Please give him a call in regards to wear these should be sent. Thank you.    Phone Number Patient can be reached at: Home number on file [phone] (home)    Best Time:     Can we leave a detailed message on this number? YES    Call taken on 3/6/2018 at 2:02 PM by Shannon Lemos
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
## 2018-06-07 ENCOUNTER — DOCUMENTATION ONLY (OUTPATIENT)
Dept: SLEEP MEDICINE | Facility: CLINIC | Age: 63
End: 2018-06-07
Payer: COMMERCIAL

## 2018-06-07 NOTE — PROGRESS NOTES
6 month [facility] Recheck Visit     Diagnostic AHI:  10.5     Data only recheck     Assessment: Pt meeting objective benchmarks.  Patient compliance is 97% the last 6 months.   Action plan:   Pt to follow up per provider request (1-2 yrs).       Device type: Auto-CPAP  PAP settings: CPAP min 5 cm  H20     CPAP max 15 cm  H20    95th% pressure 9.9 cm  H20   Objective measures: 14 day rolling measures      Compliance  78 %      Leak  19.2 lpm  last  upload      AHI 2.04   last  upload      Average number of minutes 404      Objective measure goal  Compliance   Goal >70%  Leak   Goal < 24 lpm  AHI  Goal < 5  Usage  Goal >240

## 2018-06-08 ENCOUNTER — OFFICE VISIT (OUTPATIENT)
Dept: FAMILY MEDICINE | Facility: CLINIC | Age: 63
End: 2018-06-08
Payer: COMMERCIAL

## 2018-06-08 VITALS
DIASTOLIC BLOOD PRESSURE: 69 MMHG | OXYGEN SATURATION: 99 % | TEMPERATURE: 97.8 F | WEIGHT: 181 LBS | HEART RATE: 69 BPM | BODY MASS INDEX: 25.34 KG/M2 | RESPIRATION RATE: 14 BRPM | SYSTOLIC BLOOD PRESSURE: 102 MMHG | HEIGHT: 71 IN

## 2018-06-08 DIAGNOSIS — Z12.5 SCREENING FOR PROSTATE CANCER: ICD-10-CM

## 2018-06-08 DIAGNOSIS — Z00.00 HEALTH CARE MAINTENANCE: Primary | ICD-10-CM

## 2018-06-08 DIAGNOSIS — E78.5 HYPERLIPIDEMIA LDL GOAL <160: ICD-10-CM

## 2018-06-08 LAB
ALT SERPL W P-5'-P-CCNC: 21 U/L (ref 0–70)
ANION GAP SERPL CALCULATED.3IONS-SCNC: 8 MMOL/L (ref 3–14)
BUN SERPL-MCNC: 14 MG/DL (ref 7–30)
CALCIUM SERPL-MCNC: 9 MG/DL (ref 8.5–10.1)
CHLORIDE SERPL-SCNC: 107 MMOL/L (ref 94–109)
CHOLEST SERPL-MCNC: 185 MG/DL
CO2 SERPL-SCNC: 27 MMOL/L (ref 20–32)
CREAT SERPL-MCNC: 0.91 MG/DL (ref 0.66–1.25)
GFR SERPL CREATININE-BSD FRML MDRD: 85 ML/MIN/1.7M2
GLUCOSE SERPL-MCNC: 99 MG/DL (ref 70–99)
HDLC SERPL-MCNC: 65 MG/DL
HGB BLD-MCNC: 14.7 G/DL (ref 13.3–17.7)
LDLC SERPL CALC-MCNC: 108 MG/DL
NONHDLC SERPL-MCNC: 120 MG/DL
POTASSIUM SERPL-SCNC: 4.1 MMOL/L (ref 3.4–5.3)
PSA SERPL-ACNC: 0.59 UG/L (ref 0–4)
SODIUM SERPL-SCNC: 142 MMOL/L (ref 133–144)
TRIGL SERPL-MCNC: 58 MG/DL

## 2018-06-08 PROCEDURE — 80061 LIPID PANEL: CPT | Performed by: FAMILY MEDICINE

## 2018-06-08 PROCEDURE — 80048 BASIC METABOLIC PNL TOTAL CA: CPT | Performed by: FAMILY MEDICINE

## 2018-06-08 PROCEDURE — 99214 OFFICE O/P EST MOD 30 MIN: CPT | Performed by: FAMILY MEDICINE

## 2018-06-08 PROCEDURE — 85018 HEMOGLOBIN: CPT | Performed by: FAMILY MEDICINE

## 2018-06-08 PROCEDURE — G0103 PSA SCREENING: HCPCS | Performed by: FAMILY MEDICINE

## 2018-06-08 PROCEDURE — 36415 COLL VENOUS BLD VENIPUNCTURE: CPT | Performed by: FAMILY MEDICINE

## 2018-06-08 PROCEDURE — 84460 ALANINE AMINO (ALT) (SGPT): CPT | Performed by: FAMILY MEDICINE

## 2018-06-08 RX ORDER — SIMVASTATIN 10 MG
TABLET ORAL
Qty: 90 TABLET | Refills: 3 | Status: SHIPPED | OUTPATIENT
Start: 2018-06-08 | End: 2019-07-11

## 2018-06-08 RX ORDER — LORATADINE 10 MG/1
10 TABLET ORAL DAILY
COMMUNITY

## 2018-06-08 ASSESSMENT — ANXIETY QUESTIONNAIRES
7. FEELING AFRAID AS IF SOMETHING AWFUL MIGHT HAPPEN: NOT AT ALL
5. BEING SO RESTLESS THAT IT IS HARD TO SIT STILL: NOT AT ALL
1. FEELING NERVOUS, ANXIOUS, OR ON EDGE: SEVERAL DAYS
3. WORRYING TOO MUCH ABOUT DIFFERENT THINGS: SEVERAL DAYS
6. BECOMING EASILY ANNOYED OR IRRITABLE: SEVERAL DAYS
2. NOT BEING ABLE TO STOP OR CONTROL WORRYING: NOT AT ALL
GAD7 TOTAL SCORE: 3
IF YOU CHECKED OFF ANY PROBLEMS ON THIS QUESTIONNAIRE, HOW DIFFICULT HAVE THESE PROBLEMS MADE IT FOR YOU TO DO YOUR WORK, TAKE CARE OF THINGS AT HOME, OR GET ALONG WITH OTHER PEOPLE: NOT DIFFICULT AT ALL

## 2018-06-08 ASSESSMENT — PATIENT HEALTH QUESTIONNAIRE - PHQ9: 5. POOR APPETITE OR OVEREATING: NOT AT ALL

## 2018-06-08 NOTE — MR AVS SNAPSHOT
After Visit Summary   6/8/2018    Cheko Rodarte    MRN: [id number]           Patient Information     Date Of Birth          1955        Visit Information        Provider Department      6/8/2018 10:00 AM Esteban [facility] MD [facility]        Today's [facility] maintenance    -  1    Screening for prostate cancer        Hyperlipidemia LDL goal <160           Follow-ups after your visit        Follow-up notes from your care team     Return in about 1 year (around 6/8/2019) for Physical Exam.      Who to contact     If you have questions or need follow up information about today's clinic visit or your schedule please contact [facility] directly at [phone].  Normal or non-critical lab and imaging results will be communicated to you by MyChart, letter or phone within 4 business days after the clinic has received the results. If you do not hear from us within 7 days, please contact the clinic through [employer] or phone. If you have a critical or abnormal lab result, we will notify you by phone as soon as possible.  Submit refill requests through [email or web address] or call your pharmacy and they will forward the refill request to us. Please allow 3 business days for your refill to be completed.          Additional Information About Your Visit        MyChart Information     [email or web address] gives you secure access to your electronic health record. If you see a primary care provider, you can also send messages to your care team and make appointments. If you have questions, please call your primary care clinic.  If you do not have a primary care provider, please call [phone] and they will assist you.        Care EveryWhere ID     This is your Care EveryWhere ID. This could be used by other organizations to access your [place] medical records  [id number]        Your Vitals Were     Pulse Temperature Respirations Height Pulse Oximetry BMI (Body Mass Index)    69 97.8  F  "(36.6  C) (Tympanic) 14 5' 10.5\" (1.791 m) 99% 25.6 kg/m2       Blood Pressure from Last 3 Encounters:   06/08/18 102/69   01/26/18 112/75   11/20/17 112/68    Weight from Last 3 Encounters:   06/08/18 181 lb (82.1 kg)   01/26/18 184 lb (83.5 kg)   11/20/17 179 lb (81.2 kg)              We Performed the Following     ALT     Basic metabolic panel  (Ca, Cl, CO2, Creat, Gluc, K, Na, BUN)     Hemoglobin     Lipid panel reflex to direct LDL Fasting     PSA, screen          Where to get your medicines      These medications were sent to [id number] - [place], [place] - [address]  [address] [address] [facility] [address]     Phone:  [phone]     simvastatin 10 MG tablet          Primary Care Provider Office Phone # Fax #    Esteban Herron [phone]       [address] [facility] [address]        Equal Access to Services     [facility]: Hadii yahaira ku hadasho Soomaali, waaxda luqadaha, qaybta kaalmada adeegyaja, naseem gold . So [facility] [phone].    ATENCIÓN: Si habla español, tiene a orlando disposición servicios gratuitos de asistencia lingüística. [facility] [phone].    We comply with applicable federal civil rights laws and [place] laws. We do not discriminate on the basis of race, color, national origin, age, disability, sex, sexual orientation, or gender identity.            Thank you!     Thank you for choosing [facility]  for your care. Our goal is always to provide you with excellent care. Hearing back from our patients is one way we can continue to improve our services. Please take a few minutes to complete the written survey that you may receive in the mail after your visit with us. Thank you!             Your Updated Medication List - Protect others around you: Learn how to safely use, store and throw away your medicines at www.disposemymeds.org.          This list is accurate as of 6/8/18 10:29 AM.  " Always use your most recent med list.                   Brand Name Dispense Instructions for use Diagnosis    cholecalciferol 2000 units tablet     90 tablet    Take 2 tablets by mouth daily    Hyperlipidemia LDL goal <160       citalopram 20 MG tablet    celeXA    90 tablet    TAKE 1 TABLET (20 MG) BY MOUTH DAILY    Insomnia, unspecified type       Co Q 10 100 MG Caps      Take 300 mg by mouth        KRILL OIL PO      Take by mouth daily        loratadine 10 MG tablet    CLARITIN     Take 10 mg by mouth daily        PROSTATE HEALTH PO           simvastatin 10 MG tablet    ZOCOR    90 tablet    TAKE 1 TABLET BY MOUTH DAILY AT BEDTIME    Hyperlipidemia LDL goal <160

## 2018-06-08 NOTE — PROGRESS NOTES
SUBJECTIVE:   Cheko Rodarte is a 62 year old male who presents to clinic today for the following health issues:      Hyperlipidemia Follow-Up      Rate your low fat/cholesterol diet?: good    Taking statin?  Yes, no muscle aches from statin    Other lipid medications/supplements?:  none      Amount of exercise or physical activity: 4-5 days/week for an average of 30-45 minutes    Problems taking medications regularly: No    Medication side effects: none    Diet: regular (no restrictions)        Musculoskeletal problem/pain      Duration: about 5 years on and off     Description  Location: left elbow       Problem list and histories reviewed & adjusted, as indicated.  Additional history: as documented    Patient Active Problem List   Diagnosis     Anxiety state     Aneurysm of thoracic aorta (H)     Insomnia     Snoring     History of nephrectomy, unilateral     HYPERLIPIDEMIA LDL GOAL <160     Health Care Home     Advanced directives, counseling/discussion     History of prostatitis     Medial epicondylitis of right elbow     Past Surgical History:   Procedure Laterality Date     CARPAL TUNNEL RELEASE RT/LT       HC COLONOSCOPY THRU STOMA, DIAGNOSTIC  2004    polyp     LASIK  6/6/2012     NEPHRECTOMY RT/LT      left     SURGICAL HISTORY OF -       basal cell removal forehead       Social History   Substance Use Topics     Smoking status: Never Smoker     Smokeless tobacco: Never Used     Alcohol use 7.0 oz/week     Family History   Problem Relation Age of Onset     C.A.D. Father      thoracic aneurysm     Cancer - colorectal Father      CANCER Father      bladder     Cancer - colorectal Maternal Uncle      Cancer - colorectal Paternal Uncle      Circulatory Brother      thoracic Aneurysm         Current Outpatient Prescriptions   Medication Sig Dispense Refill     cholecalciferol 2000 UNITS tablet Take 2 tablets by mouth daily 90 tablet 3     citalopram (CELEXA) 20 MG tablet TAKE 1 TABLET (20 MG) BY MOUTH DAILY 90  "tablet 3     Coenzyme Q10 (CO Q 10) 100 MG CAPS Take 300 mg by mouth       loratadine (CLARITIN) 10 MG tablet Take 10 mg by mouth daily       Misc Natural Products (PROSTATE HEALTH PO)        simvastatin (ZOCOR) 10 MG tablet TAKE 1 TABLET BY MOUTH DAILY AT BEDTIME 90 tablet 3     KRILL OIL PO Take by mouth daily       [DISCONTINUED] simvastatin (ZOCOR) 10 MG tablet TAKE 1 TABLET BY MOUTH DAILY AT BEDTIME 30 tablet 0     Allergies   Allergen Reactions     Food Other (See Comments)     Has to clear throat continuously      Recent Labs   Lab Test  07/21/17   1016  07/12/16   1132  04/24/15   0801  04/15/14   1327   04/27/10   0907   LDL  70  88  83  77   < >  112   HDL  101  91  86  94   < >  82   TRIG  49  57  59  69   < >  64   ALT  17  21  19  21   < >  22   CR  0.85  0.85  0.87  0.88   < >  0.91   GFRESTIMATED  >90  Non  GFR Calc    >90  Non  GFR Calc    90  90   < >  88   GFRESTBLACK  >90   GFR Calc    >90   GFR Calc    >90   GFR Calc    >90   < >  >90   POTASSIUM  4.1  4.0  4.3  4.2   < >  4.7   TSH   --    --    --   0.90   --   1.41    < > = values in this interval not displayed.      BP Readings from Last 3 Encounters:   06/08/18 102/69   01/26/18 112/75   11/20/17 112/68    Wt Readings from Last 3 Encounters:   06/08/18 181 lb (82.1 kg)   01/26/18 184 lb (83.5 kg)   11/20/17 179 lb (81.2 kg)                    Reviewed and updated as needed this visit by clinical staff       Reviewed and updated as needed this visit by Provider         ROS:  Constitutional, HEENT, cardiovascular, pulmonary, gi and gu systems are negative, except as otherwise noted.    OBJECTIVE:     /69 (Cuff Size: Adult Regular)  Pulse 69  Temp 97.8  F (36.6  C) (Tympanic)  Resp 14  Ht 5' 10.5\" (1.791 m)  Wt 181 lb (82.1 kg)  SpO2 99%  BMI 25.6 kg/m2  Body mass index is 25.6 kg/(m^2).  GENERAL: healthy, alert and no distress  NECK: no adenopathy, no " asymmetry, masses, or scars and thyroid normal to palpation  RESP: lungs clear to auscultation - no rales, rhonchi or wheezes  CV: regular rate and rhythm, normal S1 S2, no S3 or S4, no murmur, click or rub, no peripheral edema and peripheral pulses strong  ABDOMEN: soft, nontender, no hepatosplenomegaly, no masses and bowel sounds normal  MS: no gross musculoskeletal defects noted, no edema        ASSESSMENT/PLAN:   ASSESSMENT / PLAN:  (Z00.00) Health care maintenance  (primary encounter diagnosis)  Plan: Hemoglobin, Basic metabolic panel  (Ca, Cl,         CO2, Creat, Gluc, K, Na, BUN), ALT, Lipid panel        reflex to direct LDL Fasting, PSA, screen            (Z12.5) Screening for prostate cancer  Plan: PSA, screen            (E78.5) Hyperlipidemia LDL goal <160  Plan: simvastatin (ZOCOR) 10 MG tablet                FUTURE APPOINTMENTS:       - Follow-up visit in 1 year     Esteban Herron MD  [facility] [place]

## 2018-06-09 ASSESSMENT — ANXIETY QUESTIONNAIRES: GAD7 TOTAL SCORE: 3

## 2018-06-09 ASSESSMENT — PATIENT HEALTH QUESTIONNAIRE - PHQ9: SUM OF ALL RESPONSES TO PHQ QUESTIONS 1-9: 6

## 2018-07-26 ENCOUNTER — MYC MEDICAL ADVICE (OUTPATIENT)
Dept: FAMILY MEDICINE | Facility: CLINIC | Age: 63
End: 2018-07-26

## 2018-07-26 ENCOUNTER — OFFICE VISIT (OUTPATIENT)
Dept: FAMILY MEDICINE | Facility: CLINIC | Age: 63
End: 2018-07-26
Payer: COMMERCIAL

## 2018-07-26 VITALS
SYSTOLIC BLOOD PRESSURE: 107 MMHG | WEIGHT: 176 LBS | HEART RATE: 80 BPM | RESPIRATION RATE: 14 BRPM | DIASTOLIC BLOOD PRESSURE: 75 MMHG | OXYGEN SATURATION: 100 % | TEMPERATURE: 99.4 F | HEIGHT: 70 IN | BODY MASS INDEX: 25.2 KG/M2

## 2018-07-26 DIAGNOSIS — J20.9 ACUTE BRONCHITIS, UNSPECIFIED ORGANISM: Primary | ICD-10-CM

## 2018-07-26 DIAGNOSIS — Z23 NEED FOR SHINGLES VACCINE: ICD-10-CM

## 2018-07-26 DIAGNOSIS — Z11.4 SCREENING FOR HIV (HUMAN IMMUNODEFICIENCY VIRUS): ICD-10-CM

## 2018-07-26 DIAGNOSIS — I71.20 THORACIC AORTIC ANEURYSM WITHOUT RUPTURE (H): ICD-10-CM

## 2018-07-26 PROCEDURE — 99214 OFFICE O/P EST MOD 30 MIN: CPT | Performed by: FAMILY MEDICINE

## 2018-07-26 RX ORDER — DOXYCYCLINE 100 MG/1
100 TABLET ORAL 2 TIMES DAILY
Qty: 28 TABLET | Refills: 0 | Status: SHIPPED | OUTPATIENT
Start: 2018-07-26 | End: 2018-08-02

## 2018-07-26 RX ORDER — HYPOCHLOROUS ACID/SODIUM CHLOR 0.01 %
SPRAY, NON-AEROSOL (ML) TOPICAL
COMMUNITY
Start: 2018-06-07

## 2018-07-26 ASSESSMENT — ANXIETY QUESTIONNAIRES
1. FEELING NERVOUS, ANXIOUS, OR ON EDGE: NOT AT ALL
3. WORRYING TOO MUCH ABOUT DIFFERENT THINGS: NOT AT ALL
IF YOU CHECKED OFF ANY PROBLEMS ON THIS QUESTIONNAIRE, HOW DIFFICULT HAVE THESE PROBLEMS MADE IT FOR YOU TO DO YOUR WORK, TAKE CARE OF THINGS AT HOME, OR GET ALONG WITH OTHER PEOPLE: NOT DIFFICULT AT ALL
2. NOT BEING ABLE TO STOP OR CONTROL WORRYING: NOT AT ALL
5. BEING SO RESTLESS THAT IT IS HARD TO SIT STILL: NOT AT ALL
7. FEELING AFRAID AS IF SOMETHING AWFUL MIGHT HAPPEN: NOT AT ALL
GAD7 TOTAL SCORE: 1
6. BECOMING EASILY ANNOYED OR IRRITABLE: NOT AT ALL

## 2018-07-26 ASSESSMENT — PATIENT HEALTH QUESTIONNAIRE - PHQ9: 5. POOR APPETITE OR OVEREATING: SEVERAL DAYS

## 2018-07-26 NOTE — MR AVS SNAPSHOT
After Visit Summary   7/26/2018    Cheko Rodarte    MRN: [id number]           Patient Information     Date Of Birth          1955        Visit Information        Provider Department      7/26/2018 3:20 PM Casper Reed Jr., MD [facility] [place]        Today's Diagnoses     Acute bronchitis, unspecified organism    -  1    Screening for HIV (human immunodeficiency virus)        Need for shingles vaccine          Care Instructions    Shingrix          Follow-ups after your visit        Follow-up notes from your care team     Return in about 5 months (around 12/26/2018) for recheck citalopram .      Who to contact     If you have questions or need follow up information about today's clinic visit or your schedule please contact [facility] [place] directly at [phone].  Normal or non-critical lab and imaging results will be communicated to you by MyChart, letter or phone within 4 business days after the clinic has received the results. If you do not hear from us within 7 days, please contact the clinic through MyChart or phone. If you have a critical or abnormal lab result, we will notify you by phone as soon as possible.  Submit refill requests through [employer] or call your pharmacy and they will forward the refill request to us. Please allow 3 business days for your refill to be completed.          Additional Information About Your Visit        MyChart Information     [employer] gives you secure access to your electronic health record. If you see a primary care provider, you can also send messages to your care team and make appointments. If you have questions, please call your primary care clinic.  If you do not have a primary care provider, please call [phone] and they will assist you.        Care EveryWhere ID     This is your Care EveryWhere ID. This could be used by other organizations to access your [place] medical records  [id number]        Your Vitals Were   "   Pulse Temperature Respirations Height Pulse Oximetry BMI (Body Mass Index)    80 99.4  F (37.4  C) (Tympanic) 14 5' 10\" (1.778 m) 100% 25.25 kg/m2       Blood Pressure from Last 3 Encounters:   07/26/18 107/75   06/08/18 102/69   01/26/18 112/75    Weight from Last 3 Encounters:   07/26/18 176 lb (79.8 kg)   06/08/18 181 lb (82.1 kg)   01/26/18 184 lb (83.5 kg)              Today, you had the following     No orders found for display         Today's Medication Changes          These changes are accurate as of 7/26/18  3:28 PM.  If you have any questions, ask your nurse or doctor.               Start taking these medicines.        Dose/Directions    doxycycline Monohydrate 100 MG Tabs   Used for:  Acute bronchitis, unspecified organism   Started by:  Casper Reed Jr., MD        Dose:  100 mg   Take 100 mg by mouth 2 times daily for 14 days   Quantity:  28 tablet   Refills:  0            Where to get your medicines      These medications were sent to [id number] - [place], [place] - [address] [address] [facility]  [address] [address] [facility] [address]     Phone:  [phone]     doxycycline Monohydrate 100 MG Tabs                Primary Care Provider Office Phone # Fax #    Esteban Herron [phone]       [address] [facility] [address]        Equal Access to Services     [facility]: Hadii yahaira ku hadasho Soomaali, waaxda luqadaha, qaybta kaalmada adeegyada, waxay alicia king'tracie ah. So [facility] [phone].    ATENCIÓN: Si habla español, tiene a orlando disposición servicios gratuitos de asistencia lingüística. Llame al [phone].    We comply with applicable federal civil rights laws and [place] laws. We do not discriminate on the basis of race, color, national origin, age, disability, sex, sexual orientation, or gender identity.            Thank you!     Thank you for choosing [facility] [place]  for your care. Our goal is always to " provide you with excellent care. Hearing back from our patients is one way we can continue to improve our services. Please take a few minutes to complete the written survey that you may receive in the mail after your visit with us. Thank you!             Your Updated Medication List - Protect others around you: Learn how to safely use, store and throw away your medicines at www.disposemymeds.org.          This list is accurate as of 7/26/18  3:28 PM.  Always use your most recent med list.                   Brand Name Dispense Instructions for use Diagnosis    AVENOVA 0.01 % Soln           cholecalciferol 2000 units tablet     90 tablet    Take 2 tablets by mouth daily    Hyperlipidemia LDL goal <160       citalopram 20 MG tablet    celeXA    90 tablet    TAKE 1 TABLET (20 MG) BY MOUTH DAILY    Insomnia, unspecified type       Co Q 10 100 MG Caps      Take 300 mg by mouth        doxycycline Monohydrate 100 MG Tabs     28 tablet    Take 100 mg by mouth 2 times daily for 14 days    Acute bronchitis, unspecified organism       KRILL OIL PO      Take by mouth daily        loratadine 10 MG tablet    CLARITIN     Take 10 mg by mouth daily        PROSTATE HEALTH PO           simvastatin 10 MG tablet    ZOCOR    90 tablet    TAKE 1 TABLET BY MOUTH DAILY AT BEDTIME    Hyperlipidemia LDL goal <160

## 2018-07-26 NOTE — PROGRESS NOTES
"  SUBJECTIVE:   Cheko Rodarte is a 62 year old male who presents to clinic today for the following health issues:      RESPIRATORY SYMPTOMS      Duration: 5 days     Description  cough, fatigue/malaise, fever and myalgias    Severity: moderate    Accompanying signs and symptoms: None    History (predisposing factors):  Traveled to [facility] 6-7 weeks ago     Precipitating or alleviating factors: None    Therapies tried and outcome:  Ibuprofen     Had temp of 102 at 0300 today.    Notes some posterior thoracic back pain.          Problem list and histories reviewed & adjusted, as indicated.  Additional history: as documented    BP Readings from Last 3 Encounters:   07/26/18 107/75   06/08/18 102/69   01/26/18 112/75    Wt Readings from Last 3 Encounters:   07/26/18 176 lb (79.8 kg)   06/08/18 181 lb (82.1 kg)   01/26/18 184 lb (83.5 kg)                    Reviewed and updated as needed this visit by clinical staff       Reviewed and updated as needed this visit by Provider         ROS:  Constitutional, HEENT, cardiovascular, pulmonary, gi and gu systems are negative, except as otherwise noted.    OBJECTIVE:     /75 (Cuff Size: Adult Regular)  Pulse 80  Temp 99.4  F (37.4  C) (Tympanic)  Resp 14  Ht 5' 10\" (1.778 m)  Wt 176 lb (79.8 kg)  SpO2 100%  BMI 25.25 kg/m2  Body mass index is 25.25 kg/(m^2).  GENERAL: healthy, alert and no distress  EYES: Eyes grossly normal to inspection, PERRL and conjunctivae and sclerae normal  HENT: ear canals and TM's normal, nose and mouth without ulcers or lesions  NECK: no adenopathy, no asymmetry, masses, or scars and thyroid normal to palpation  RESP: lungs clear to auscultation - no rales, rhonchi or wheezes  CV: regular rate and rhythm, normal S1 S2, no S3 or S4, no murmur, click or rub, no peripheral edema and peripheral pulses strong  MS: no gross musculoskeletal defects noted, no edema    Diagnostic Test Results:  none     ASSESSMENT/PLAN:             1. Acute " bronchitis, unspecified organism  Given fever earlier this morning, will treat with antibiotics. Return to clinic in 10-14 days if not improving, sooner if worsens.   - doxycycline Monohydrate 100 MG TABS; Take 100 mg by mouth 2 times daily for 14 days  Dispense: 28 tablet; Refill: 0    2. Screening for HIV (human immunodeficiency virus)  Declines this today.    3. Need for shingles vaccine  Plans to discuss Shingrix with his pharmacist at [facility].    Review of chart shows he has a thoracic AAA that does not appear to have been imaged in the past several years.  Should consider imaging this at his Follow up appointment in 4 months.      See Patient Instructions    Casper Reed Jr, MD  [facility] [place] [facility]

## 2018-07-27 ASSESSMENT — ANXIETY QUESTIONNAIRES: GAD7 TOTAL SCORE: 1

## 2018-07-27 ASSESSMENT — PATIENT HEALTH QUESTIONNAIRE - PHQ9: SUM OF ALL RESPONSES TO PHQ QUESTIONS 1-9: 5

## 2018-07-29 ENCOUNTER — TRANSFERRED RECORDS (OUTPATIENT)
Dept: HEALTH INFORMATION MANAGEMENT | Facility: CLINIC | Age: 63
End: 2018-07-29

## 2018-07-29 LAB
CREAT SERPL-MCNC: 0.83 MG/DL (ref 0.67–1.17)
GFR SERPL CREATININE-BSD FRML MDRD: >60 ML/MIN/1.73M2 (ref 60–150)
GLUCOSE SERPL-MCNC: 115 MG/DL (ref 74–100)
POTASSIUM SERPL-SCNC: 3.9 MMOL/L (ref 3.5–5.1)

## 2018-07-30 ENCOUNTER — TRANSFERRED RECORDS (OUTPATIENT)
Dept: HEALTH INFORMATION MANAGEMENT | Facility: CLINIC | Age: 63
End: 2018-07-30

## 2018-07-30 LAB
CREAT SERPL-MCNC: 0.73 MG/DL (ref 0.67–1.17)
GFR SERPL CREATININE-BSD FRML MDRD: >60 ML/MIN/1.73M2 (ref 60–150)
GLUCOSE SERPL-MCNC: 118 MG/DL (ref 74–100)

## 2018-07-31 NOTE — TELEPHONE ENCOUNTER
Patient calling because he received a call from Radiology scheduling and was unsure what this was about - he had not reviewed his MD-IT message.     I reviewed message with him. He reports that he actually has annual CT scans done through [facility]. He reports that this has grown in size, but was stable this past year. He states that it is currently measuring at 4.7.     Patient was seen at [facility] in [place] over the weekend as he was feeling worse, and was diagnosed with pneumonia. He was switched to azithromycin for treatment and advised to follow up with his PCP.    Patient is scheduled for an ER follow up with Dr. Herron on 8/7/18. Advised patient to be seen sooner if any new or worsening symptoms occur prior to that appointment.    Patient was very appreciative of Dr. Reed's concern and care and wanted me to relay that message to him.    Of note, I have cancelled open CT order. Routing to MD PEG as CHRISTOFER.  Dominiqeu Hnuter RN, BSN  [facility]

## 2018-08-01 ENCOUNTER — TELEPHONE (OUTPATIENT)
Dept: FAMILY MEDICINE | Facility: CLINIC | Age: 63
End: 2018-08-01

## 2018-08-01 NOTE — TELEPHONE ENCOUNTER
Pt was seen by Dr Reed on 7/26 got worse and went  [facility] ER on Sunday 7/29 with pneumonia. Not improvement and throwing up so went back to [facility]. Today he has a fever of 101.5 and feeling nauseous. Sending call to triage.  Zelda Gillespie,

## 2018-08-01 NOTE — TELEPHONE ENCOUNTER
S/w pt who states wife is now talking with a triage nurse.  Advised pt will let wife and nurse continue at this time.    Nelli Gonzalez RN  EP Triage

## 2018-08-01 NOTE — TELEPHONE ENCOUNTER
Wife called back to follow up noting patient has fever of 101. Encouraged to maintain Tylenol and ibuprofen for fever control.   Not drinking well due to nausea.  Discussed replacing fluids and provided options such as pyoceles, broths, juices etc.  Wife will encourage patient.     Denied severe weakness, light headed, dizziness, speech difficulties, confusion, vision changes.      Advised to seek ER care if having severe weakness, lightheaded, dizziness,     Wife agreed with plan and will follow up with OV tomorrow or ED sooner if needed.  Destinee Elena RN - Triage  [facility]

## 2018-08-01 NOTE — TELEPHONE ENCOUNTER
Wife reports that the patient is definitely not improving, not worse. Reports that he has a sore stomach and nausea. No vomiting or diarrhea. Has been sipping on Gator aide, not sure how much he liquid he has had today.     Denies chest pain or SOB. Has a weak, dry cough. Have been alternating tylenol and ibuprofen.    Ate taco last evening, grape nut cereal with milk this morning.     Was able to sleep, but not all night.     Wife states that he got IV azithromycin at the ER Sunday. Took first 2 tablets of Z hope yesterday. States that their daughter is a pharmacist at [facility] said they give 2 tablets a day the first 3 days of the Z hope, 1 tab the last day. Advised that they take it as prescribed by the patient's doctor.     Advised of 24/7 nurse line if new or worsening symptoms. If chest pain, SOB seek emergency care.  Advised of home care - frequent fluids, small amounts of healthy foods, rest, fresh air. Advised not to take ibuprofen on an empty stomach or avoid ibuprofen which can cause nausea and a sore stomach.   Patient scheduled for follow up tomorrow with Dr. Braxton tomorrow at 2 pm    Guideline used:    Shayna Sweet  2016. Telephone Triage Protocols for Nurses. Fifth edition.    Shayna Parada RN

## 2018-08-02 ENCOUNTER — TRANSFERRED RECORDS (OUTPATIENT)
Dept: FAMILY MEDICINE | Facility: CLINIC | Age: 63
End: 2018-08-02

## 2018-08-02 ENCOUNTER — RADIANT APPOINTMENT (OUTPATIENT)
Dept: GENERAL RADIOLOGY | Facility: CLINIC | Age: 63
End: 2018-08-02
Attending: FAMILY MEDICINE
Payer: COMMERCIAL

## 2018-08-02 ENCOUNTER — OFFICE VISIT (OUTPATIENT)
Dept: FAMILY MEDICINE | Facility: CLINIC | Age: 63
End: 2018-08-02
Payer: COMMERCIAL

## 2018-08-02 ENCOUNTER — TELEPHONE (OUTPATIENT)
Dept: FAMILY MEDICINE | Facility: CLINIC | Age: 63
End: 2018-08-02

## 2018-08-02 VITALS
BODY MASS INDEX: 25.97 KG/M2 | DIASTOLIC BLOOD PRESSURE: 70 MMHG | TEMPERATURE: 98.4 F | WEIGHT: 181 LBS | HEART RATE: 73 BPM | SYSTOLIC BLOOD PRESSURE: 106 MMHG | OXYGEN SATURATION: 98 %

## 2018-08-02 DIAGNOSIS — R05.9 COUGH: Primary | ICD-10-CM

## 2018-08-02 DIAGNOSIS — J18.9 PNEUMONIA OF LOWER LOBE DUE TO INFECTIOUS ORGANISM, UNSPECIFIED LATERALITY: ICD-10-CM

## 2018-08-02 DIAGNOSIS — R05.9 COUGH: ICD-10-CM

## 2018-08-02 LAB
ALT SERPL-CCNC: 20 U/L (ref 14–63)
AST SERPL-CCNC: 17 U/L (ref 15–37)
CREAT SERPL-MCNC: 0.86 MG/DL (ref 0.67–1.17)
GFR SERPL CREATININE-BSD FRML MDRD: >60 ML/MIN/1.73ME2 (ref 60–150)
GLUCOSE SERPL-MCNC: 121 MG/DL (ref 74–100)
POTASSIUM SERPL-SCNC: 3.6 MMOL/L (ref 3.5–5.1)

## 2018-08-02 PROCEDURE — 99214 OFFICE O/P EST MOD 30 MIN: CPT | Performed by: FAMILY MEDICINE

## 2018-08-02 PROCEDURE — 71046 X-RAY EXAM CHEST 2 VIEWS: CPT | Mod: FY

## 2018-08-02 RX ORDER — CODEINE PHOSPHATE AND GUAIFENESIN 10; 100 MG/5ML; MG/5ML
1 SOLUTION ORAL EVERY 4 HOURS PRN
Qty: 120 ML | Refills: 0 | Status: SHIPPED | OUTPATIENT
Start: 2018-08-02

## 2018-08-02 RX ORDER — AZITHROMYCIN 250 MG/1
TABLET, FILM COATED ORAL
COMMUNITY
Start: 2018-07-30 | End: 2018-08-02

## 2018-08-02 RX ORDER — ALBUTEROL SULFATE 90 UG/1
2 AEROSOL, METERED RESPIRATORY (INHALATION) EVERY 6 HOURS PRN
Qty: 1 INHALER | Refills: 0 | Status: SHIPPED | OUTPATIENT
Start: 2018-08-02

## 2018-08-02 NOTE — TELEPHONE ENCOUNTER
Reason for Call:  Other call back    Detailed comments: Pt's wife called this afternoon and would like to speak with a nurse in regards to some medication that the pt is getting sick off of. Please give pt's wife a call back ASAP in regards to this- the pt has vomited up the meds that were recently given to him. They just do not know what to do. Thank you.    Phone Number Patient can be reached at: Pt's Wife--neil-- [phone]    Best Time:     Can we leave a detailed message on this number? YES    Call taken on 8/2/2018 at 4:32 PM by Shannon Lemos

## 2018-08-02 NOTE — PROGRESS NOTES
SUBJECTIVE:   Cheko Rodarte is a 62 year old male who presents to clinic today for the following health issues:      ED/UC Followup:    Facility:  [address] [place] [address]  Date of visit: 7/29 and 7/30  Reason for visit: worsening cough - headache - body aches  Current Status: no improvement      Patient came today after 2 ER visits in the last 1 week and one office visit.  Initially seen in the clinic he was prescribed doxycycline.  For possible bronchitis.  He took 1 or 2 days of antibiotic but later developed some nausea and vomiting and he went to the ER .  X-ray was done which indicates right lower lobe pneumonia.  He was prescribed Augmentin and discharged home.  Later that day ,he was also given pain medication.  After taking the pain medication he felt nauseous and he went back because he was not able to keep any food down.  Both pain medication and Augmentin was stopped and he was started on azithromycin.  He is on third day of azithromycin.  He does have on and off fever but his cough is still worse.  Denies any chest pains no increased shortness of breath.        Problem list and histories reviewed & adjusted, as indicated.  Additional history: as documented    Patient Active Problem List   Diagnosis     Anxiety state     Aneurysm of thoracic aorta (H)     Insomnia     Snoring     History of nephrectomy, unilateral     HYPERLIPIDEMIA LDL GOAL <160     Health Care Home     Advanced directives, counseling/discussion     History of prostatitis     Medial epicondylitis of right elbow     Past Surgical History:   Procedure Laterality Date     CARPAL TUNNEL RELEASE RT/LT       HC COLONOSCOPY THRU STOMA, DIAGNOSTIC  2004    polyp     LASIK  6/6/2012     NEPHRECTOMY RT/LT      left     SURGICAL HISTORY OF -       basal cell removal forehead       Social History   Substance Use Topics     Smoking status: Never Smoker     Smokeless tobacco: Never Used     Alcohol use 7.0 oz/week     Family History   Problem Relation  Age of Onset     C.A.D. Father      thoracic aneurysm     Cancer - colorectal Father      Cancer Father      bladder     Cancer - colorectal Maternal Uncle      Cancer - colorectal Paternal Uncle      Circulatory Brother      thoracic Aneurysm         Current Outpatient Prescriptions   Medication Sig Dispense Refill     albuterol (PROAIR HFA/PROVENTIL HFA/VENTOLIN HFA) 108 (90 Base) MCG/ACT Inhaler Inhale 2 puffs into the lungs every 6 hours as needed for shortness of breath / dyspnea or wheezing 1 Inhaler 0     cholecalciferol 2000 UNITS tablet Take 2 tablets by mouth daily 90 tablet 3     citalopram (CELEXA) 20 MG tablet TAKE 1 TABLET (20 MG) BY MOUTH DAILY 90 tablet 3     Coenzyme Q10 (CO Q 10) 100 MG CAPS Take 300 mg by mouth       Eyelid Cleansers (AVENOVA) 0.01 % SOLN        guaiFENesin-codeine (ROBITUSSIN AC) 100-10 MG/5ML SOLN solution Take 5 mLs by mouth every 4 hours as needed for cough 120 mL 0     KRILL OIL PO Take by mouth daily       loratadine (CLARITIN) 10 MG tablet Take 10 mg by mouth daily       Misc Natural Products (PROSTATE HEALTH PO)        simvastatin (ZOCOR) 10 MG tablet TAKE 1 TABLET BY MOUTH DAILY AT BEDTIME 90 tablet 3       Reviewed and updated as needed this visit by clinical staff  Tobacco  Allergies  Meds       Reviewed and updated as needed this visit by Provider         ROS:  CONSTITUTIONAL: NEGATIVE for fever, chills, change in weight  ENT/MOUTH: NEGATIVE for ear, mouth and throat problems  RESP:POSITIVE for cough-non productive  CV: NEGATIVE for chest pain, palpitations or peripheral edema    OBJECTIVE:                                                    /70  Pulse 73  Temp 98.4  F (36.9  C) (Tympanic)  Wt 181 lb (82.1 kg)  SpO2 98%  BMI 25.97 kg/m2  Body mass index is 25.97 kg/(m^2).   GENERAL: healthy, alert, well nourished, well hydrated, no distress  HENT: ear canals- normal; TMs- normal; Nose- normal; Mouth- no ulcers, no lesions  NECK: no tenderness, no  adenopathy, no asymmetry, no masses, no stiffness; thyroid- normal to palpation  RESP: Slight decreased breath sounds on the right side but overall stable.  Mild wheezing noted.  CV: regular rates and rhythm, normal S1 S2, no S3 or S4 and no murmur, no click or rub -  ABDOMEN: soft, no tenderness, no  hepatosplenomegaly, no masses, normal bowel sounds       ASSESSMENT/PLAN:                                                        ICD-10-CM    1. Cough R05 XR Chest 2 Views     albuterol (PROAIR HFA/PROVENTIL HFA/VENTOLIN HFA) 108 (90 Base) MCG/ACT Inhaler     guaiFENesin-codeine (ROBITUSSIN AC) 100-10 MG/5ML SOLN solution   2. Pneumonia of lower lobe due to infectious organism, unspecified laterality (H) J18.1 albuterol (PROAIR HFA/PROVENTIL HFA/VENTOLIN HFA) 108 (90 Base) MCG/ACT Inhaler     guaiFENesin-codeine (ROBITUSSIN AC) 100-10 MG/5ML SOLN solution     I reviewed chest x-ray he has mild haziness on the right lower lobe possible early pneumonia or resolving pneumonia.  He is currently on azithromycin.  We discussed about restarting it some other antibiotics slight levofloxacin.  Given he is taking Celexa there is interction, I suggested we should not take that medication.  He has Augmentin prescription at home.  I would suggest to add Augmentin to his current regimen to broaden the coverage to make sure he is well covered.  Augmentin usually is pretty decent antibiotic.  I would also add  albuterol inhaler for wheezing symptoms and cough medication as needed.  Patient wife is concerned how long they should wait.  I told her usually with proper antibiotic he should start feeling better in the next 24-48 hours.  If there is any chest pain shortness of breath or worsening symptom I will suggest them to be seen in the ER setting so that further evaluation including any advanced imaging can be done.  Warning signs were communicated with the patient in detail.    Tan Braxton MD  [facility]

## 2018-08-02 NOTE — MR AVS SNAPSHOT
After Visit Summary   8/2/2018    Cheko Rodarte    MRN: [id number]           Patient Information     Date Of Birth          1955        Visit Information        Provider Department      8/2/2018 2:00 PM Tan Braxton MD [facility]        Today's Diagnoses     Cough    -  1    Pneumonia of lower lobe due to infectious organism, unspecified laterality (H)           Follow-ups after your visit        Follow-up notes from your care team     Return in about 1 week (around 8/9/2018) for if symptom does not get better.      Your next 10 appointments already scheduled     Aug 07, 2018  1:20 PM CDT   Office Visit with Esteban Herron MD   [facility] ([facility])    [address] [place] [id number] [phone]           Bring a current list of meds and any records pertaining to this visit. For Physicals, please bring immunization records and any forms needing to be filled out. Please arrive 10 minutes early to complete paperwork.            Dec 21, 2018 10:00 AM CST   Office Visit with Esteban Herron MD   [facility] ([facility])    [address] [place] [id number]   [phone]           Bring a current list of meds and any records pertaining to this visit. For Physicals, please bring immunization records and any forms needing to be filled out. Please arrive 10 minutes early to complete paperwork.              Who to contact     If you have questions or need follow up information about today's clinic visit or your schedule please contact [facility] directly at [phone].  Normal or non-critical lab and imaging results will be communicated to you by MyChart, letter or phone within 4 business days after the clinic has received the results. If you do not hear from us within 7 days, please contact the clinic through MyChart or phone. If you have a critical or  abnormal lab result, we will notify you by phone as soon as possible.  Submit refill requests through [employer] or call your pharmacy and they will forward the refill request to us. Please allow 3 business days for your refill to be completed.          Additional Information About Your Visit        [employer] Information     [employer] gives you secure access to your electronic health record. If you see a primary care provider, you can also send messages to your care team and make appointments. If you have questions, please call your primary care clinic.  If you do not have a primary care provider, please call [phone] and they will assist you.        Care EveryWhere ID     This is your Care EveryWhere ID. This could be used by other organizations to access your [facility] medical records  [id number]        Your Vitals Were     Pulse Temperature Pulse Oximetry BMI (Body Mass Index)          73 98.4  F (36.9  C) (Tympanic) 98% 25.97 kg/m2         Blood Pressure from Last 3 Encounters:   08/02/18 106/70   07/26/18 107/75   06/08/18 102/69    Weight from Last 3 Encounters:   08/02/18 181 lb (82.1 kg)   07/26/18 176 lb (79.8 kg)   06/08/18 181 lb (82.1 kg)                 Today's Medication Changes          These changes are accurate as of 8/2/18  2:32 PM.  If you have any questions, ask your nurse or doctor.               Start taking these medicines.        Dose/Directions    albuterol 108 (90 Base) MCG/ACT Inhaler   Commonly known as:  PROAIR HFA/PROVENTIL HFA/VENTOLIN HFA   Used for:  Cough, Pneumonia of lower lobe due to infectious organism, unspecified laterality (H)   Started by:  Tan Braxton MD        Dose:  2 puff   Inhale 2 puffs into the lungs every 6 hours as needed for shortness of breath / dyspnea or wheezing   Quantity:  1 Inhaler   Refills:  0       guaiFENesin-codeine 100-10 MG/5ML Soln solution   Commonly known as:  ROBITUSSIN AC   Used for:  Pneumonia of lower lobe due to infectious organism, unspecified  laterality (H), Cough   Started by:  Tan Braxton MD        Dose:  1 tsp.   Take 5 mLs by mouth every 4 hours as needed for cough   Quantity:  120 mL   Refills:  0         Stop taking these medicines if you haven't already. Please contact your care team if you have questions.     azithromycin 250 MG tablet   Commonly known as:  ZITHROMAX   Stopped by:  Tan Braxton MD           doxycycline Monohydrate 100 MG Tabs   Stopped by:  Tan Braxton MD                Where to get your medicines      These medications were sent to [place] [id number] [facility] [place] [address]  [address] [address]     Phone:  [phone]     albuterol 108 (90 Base) MCG/ACT Inhaler         Some of these will need a paper prescription and others can be bought over the counter.  Ask your nurse if you have questions.     Bring a paper prescription for each of these medications     guaiFENesin-codeine 100-10 MG/5ML Soln solution                Primary Care Provider Office Phone # Fax #    Esteban ASHOK Herron [phone]       [address] [place] [id number]        Equal Access to Services     [facility] AH: Hadii aad ku hadasho Somack, waaxda luqadaha, qaybta kaalmada adeegyada, naseem gold . So [facility] [phone].    ATENCIÓN: Si habla español, tiene a orlando disposición servicios gratuitos de asistencia lingüística. Llame al [phone].    We comply with applicable federal civil rights laws and [place] laws. We do not discriminate on the basis of race, color, national origin, age, disability, sex, sexual orientation, or gender identity.            Thank you!     Thank you for choosing [facility]  for your care. Our goal is always to provide you with excellent care. Hearing back from our patients is one way we can continue to improve our services. Please take a few minutes to complete the written survey that you may receive in the mail  after your visit with us. Thank you!             Your Updated Medication List - Protect others around you: Learn how to safely use, store and throw away your medicines at www.disposemymeds.org.          This list is accurate as of 8/2/18  2:32 PM.  Always use your most recent med list.                   Brand Name Dispense Instructions for use Diagnosis    albuterol 108 (90 Base) MCG/ACT Inhaler    PROAIR HFA/PROVENTIL HFA/VENTOLIN HFA    1 Inhaler    Inhale 2 puffs into the lungs every 6 hours as needed for shortness of breath / dyspnea or wheezing    Cough, Pneumonia of lower lobe due to infectious organism, unspecified laterality (H)       AVENOVA 0.01 % Soln           cholecalciferol 2000 units tablet     90 tablet    Take 2 tablets by mouth daily    Hyperlipidemia LDL goal <160       citalopram 20 MG tablet    celeXA    90 tablet    TAKE 1 TABLET (20 MG) BY MOUTH DAILY    Insomnia, unspecified type       Co Q 10 100 MG Caps      Take 300 mg by mouth        guaiFENesin-codeine 100-10 MG/5ML Soln solution    ROBITUSSIN AC    120 mL    Take 5 mLs by mouth every 4 hours as needed for cough    Pneumonia of lower lobe due to infectious organism, unspecified laterality (H), Cough       KRILL OIL PO      Take by mouth daily        loratadine 10 MG tablet    CLARITIN     Take 10 mg by mouth daily        PROSTATE HEALTH PO           simvastatin 10 MG tablet    ZOCOR    90 tablet    TAKE 1 TABLET BY MOUTH DAILY AT BEDTIME    Hyperlipidemia LDL goal <160

## 2018-08-02 NOTE — TELEPHONE ENCOUNTER
Attempted to call patient. No answer and voicemail box full. Will try later.   Lyssa Ac RN   [facility] - Triage

## 2018-08-03 NOTE — TELEPHONE ENCOUNTER
Called patient wife and she notes that he was admitted to [facility] last night due to not getting better.  Destinee Elena RN - Triage  [facility]

## 2018-08-06 ENCOUNTER — TRANSFERRED RECORDS (OUTPATIENT)
Dept: HEALTH INFORMATION MANAGEMENT | Facility: CLINIC | Age: 63
End: 2018-08-06

## 2018-09-01 DIAGNOSIS — G47.00 INSOMNIA, UNSPECIFIED TYPE: ICD-10-CM

## 2018-09-02 RX ORDER — CITALOPRAM HYDROBROMIDE 20 MG/1
TABLET ORAL
Qty: 90 TABLET | Refills: 3 | OUTPATIENT
Start: 2018-09-02

## 2018-09-02 NOTE — TELEPHONE ENCOUNTER
"Duplicate- sent 12/5/17 x one year- info sent to pharmacy.  Marlene VaughanRN  [facility]  [phone]      Last Written Prescription Date:  12/05/17  Last Fill Quantity: 90,  # refills: 3   Last office visit: 8/2/2018 with prescribing provider:     Future Office Visit:    Requested Prescriptions   Pending Prescriptions Disp Refills     citalopram (CELEXA) 20 MG tablet [Pharmacy Med Name: CITALOPRAM HBR 20 MG TABLET] 90 tablet 3     Sig: TAKE 1 TABLET (20 MG) BY MOUTH DAILY    SSRIs Protocol Passed    9/1/2018  1:32 AM       Passed - Recent (12 mo) or future (30 days) visit within the authorizing provider's specialty    Patient had office visit in the last 12 months or has a visit in the next 30 days with authorizing provider or within the authorizing provider's specialty.  See \"Patient Info\" tab in inbasket, or \"Choose Columns\" in Meds & Orders section of the refill encounter.           Passed - Patient is age 18 or older          "

## 2018-09-04 DIAGNOSIS — G47.00 INSOMNIA, UNSPECIFIED TYPE: ICD-10-CM

## 2018-09-04 NOTE — TELEPHONE ENCOUNTER
Requested Prescriptions   Pending Prescriptions Disp Refills     citalopram (CELEXA) 20 MG tablet  citalopram (CELEXA) 20 MG tablet  Last Written Prescription Date:  12/5/17  Last Fill Quantity: 90,  # refills: 3   Last office visit: 8/2/2018 with prescribing provider:  mariano   Future Office Visit:      PHQ-9 SCORE 9/28/2010 6/8/2018 7/26/2018   Total Score 7 - -   Total Score - 6 5     LESLIE-7 SCORE 6/8/2018 7/26/2018   Total Score 3 1      90 tablet 3    There is no refill protocol information for this order

## 2018-09-05 RX ORDER — CITALOPRAM HYDROBROMIDE 20 MG/1
TABLET ORAL
Qty: 90 TABLET | Refills: 2 | Status: SHIPPED | OUTPATIENT
Start: 2018-09-05 | End: 2019-06-08

## 2018-09-05 NOTE — TELEPHONE ENCOUNTER
Prescription approved per [facility] Refill Protocol.      Nelli Gonzalez RN  EP Triage

## 2019-06-08 DIAGNOSIS — G47.00 INSOMNIA, UNSPECIFIED TYPE: ICD-10-CM

## 2019-06-10 RX ORDER — CITALOPRAM HYDROBROMIDE 20 MG/1
TABLET ORAL
Qty: 90 TABLET | Refills: 0 | Status: SHIPPED | OUTPATIENT
Start: 2019-06-10

## 2019-06-10 NOTE — TELEPHONE ENCOUNTER
"Requested Prescriptions   Pending Prescriptions Disp Refills     citalopram (CELEXA) 20 MG tablet [Pharmacy Med Name: CITALOPRAM HBR 20 MG TABLET] 90 tablet 0     Sig: TAKE 1 TABLET BY MOUTH EVERY DAY  Last Written Prescription Date:  9/5/18  Last Fill Quantity: 90,  # refills: 2   Last office visit: 8/2/2018 with prescribing provider:  Irais   Future Office Visit:           SSRIs Protocol Passed - 6/8/2019 10:41 AM        Passed - Recent (12 mo) or future (30 days) visit within the authorizing provider's specialty     Patient had office visit in the last 12 months or has a visit in the next 30 days with authorizing provider or within the authorizing provider's specialty.  See \"Patient Info\" tab in inbasket, or \"Choose Columns\" in Meds & Orders section of the refill encounter.              Passed - Medication is active on med list        Passed - Patient is age 18 or older          "

## 2019-06-10 NOTE — TELEPHONE ENCOUNTER
Prescription approved per [facility] Refill Protocol.  Shayna Parada RN

## 2019-07-11 DIAGNOSIS — E78.5 HYPERLIPIDEMIA LDL GOAL <160: ICD-10-CM

## 2019-07-11 RX ORDER — SIMVASTATIN 10 MG
TABLET ORAL
Qty: 30 TABLET | Refills: 0 | Status: SHIPPED | OUTPATIENT
Start: 2019-07-11

## 2019-07-11 NOTE — TELEPHONE ENCOUNTER
Pt would like to know if he really needs an appt as he was just in and had his lipid labs done last month.     Karen Tatum

## 2019-07-11 NOTE — TELEPHONE ENCOUNTER
He probably did lab outside [facility], please have him to fax the results for me to review, and refill meds. If he has checked the lipid panel, he may not need to come seeing me for refill

## 2019-07-11 NOTE — TELEPHONE ENCOUNTER
"Requested Prescriptions   Pending Prescriptions Disp Refills     simvastatin (ZOCOR) 10 MG tablet [Pharmacy Med Name: Simvastatin Oral Tablet 10 MG] 90 tablet 2     Sig: TAKE ONE TABLET BY MOUTH AT BEDTIME       Statins Protocol Failed - 7/11/2019  7:18 AM        Failed - LDL on file in past 12 months     Recent Labs   Lab Test 06/08/18  1027   *             Passed - No abnormal creatine kinase in past 12 months     Recent Labs   Lab Test 02/20/13  1050   CKT 95                Passed - Recent (12 mo) or future (30 days) visit within the authorizing provider's specialty     Patient had office visit in the last 12 months or has a visit in the next 30 days with authorizing provider or within the authorizing provider's specialty.  See \"Patient Info\" tab in inbasket, or \"Choose Columns\" in Meds & Orders section of the refill encounter.              Passed - Medication is active on med list        Passed - Patient is age 18 or older        Last Written Prescription Date:  6/8/2018  Last Fill Quantity: 90,  # refills: 3   Last office visit: 8/2/2018 with prescribing provider:  8/2/2018   Future Office Visit:      "

## 2019-07-11 NOTE — TELEPHONE ENCOUNTER
Patient due for fasting office visit- 30 days supply given.  Routing to team to schedule appointment     Marlene Vaughan RN  [facility]  [phone]

## 2019-10-02 ENCOUNTER — HEALTH MAINTENANCE LETTER (OUTPATIENT)
Age: 64
End: 2019-10-02

## 2021-01-15 ENCOUNTER — HEALTH MAINTENANCE LETTER (OUTPATIENT)
Age: 66
End: 2021-01-15

## 2021-09-04 ENCOUNTER — HEALTH MAINTENANCE LETTER (OUTPATIENT)
Age: 66
End: 2021-09-04

## 2022-02-19 ENCOUNTER — HEALTH MAINTENANCE LETTER (OUTPATIENT)
Age: 67
End: 2022-02-19

## 2022-05-23 DIAGNOSIS — G47.33 OBSTRUCTIVE SLEEP APNEA (ADULT) (PEDIATRIC): Primary | ICD-10-CM

## 2022-08-06 ENCOUNTER — HEALTH MAINTENANCE LETTER (OUTPATIENT)
Age: 67
End: 2022-08-06

## 2022-10-22 ENCOUNTER — HEALTH MAINTENANCE LETTER (OUTPATIENT)
Age: 67
End: 2022-10-22

## 2022-12-04 ENCOUNTER — HEALTH MAINTENANCE LETTER (OUTPATIENT)
Age: 67
End: 2022-12-04

## 2024-01-14 ENCOUNTER — HEALTH MAINTENANCE LETTER (OUTPATIENT)
Age: 69
End: 2024-01-14